# Patient Record
Sex: MALE | Race: WHITE | NOT HISPANIC OR LATINO | URBAN - METROPOLITAN AREA
[De-identification: names, ages, dates, MRNs, and addresses within clinical notes are randomized per-mention and may not be internally consistent; named-entity substitution may affect disease eponyms.]

---

## 2018-03-27 ENCOUNTER — APPOINTMENT (RX ONLY)
Dept: URBAN - METROPOLITAN AREA CLINIC 23 | Facility: CLINIC | Age: 73
Setting detail: DERMATOLOGY
End: 2018-03-27

## 2018-03-27 DIAGNOSIS — L57.0 ACTINIC KERATOSIS: ICD-10-CM

## 2018-03-27 DIAGNOSIS — L82.1 OTHER SEBORRHEIC KERATOSIS: ICD-10-CM

## 2018-03-27 DIAGNOSIS — L21.8 OTHER SEBORRHEIC DERMATITIS: ICD-10-CM

## 2018-03-27 DIAGNOSIS — D22 MELANOCYTIC NEVI: ICD-10-CM

## 2018-03-27 PROBLEM — D22.62 MELANOCYTIC NEVI OF LEFT UPPER LIMB, INCLUDING SHOULDER: Status: ACTIVE | Noted: 2018-03-27

## 2018-03-27 PROBLEM — D22.61 MELANOCYTIC NEVI OF RIGHT UPPER LIMB, INCLUDING SHOULDER: Status: ACTIVE | Noted: 2018-03-27

## 2018-03-27 PROCEDURE — ? TREATMENT REGIMEN

## 2018-03-27 PROCEDURE — ? PRESCRIPTION

## 2018-03-27 PROCEDURE — ? COUNSELING

## 2018-03-27 PROCEDURE — ? OBSERVATION AND MEASURE

## 2018-03-27 PROCEDURE — ? LIQUID NITROGEN

## 2018-03-27 PROCEDURE — ? RECORDS REQUESTED

## 2018-03-27 PROCEDURE — 99202 OFFICE O/P NEW SF 15 MIN: CPT | Mod: 25

## 2018-03-27 PROCEDURE — 17000 DESTRUCT PREMALG LESION: CPT

## 2018-03-27 RX ORDER — CLOBETASOL PROPIONATE 0.46 MG/ML
SOLUTION TOPICAL
Qty: 1 | Refills: 5 | Status: ERX | COMMUNITY
Start: 2018-03-27

## 2018-03-27 RX ORDER — KETOCONAZOLE 20.5 MG/ML
SHAMPOO, SUSPENSION TOPICAL
Qty: 1 | Refills: 11 | Status: ERX | COMMUNITY
Start: 2018-03-27

## 2018-03-27 RX ADMIN — KETOCONAZOLE: 20.5 SHAMPOO, SUSPENSION TOPICAL at 00:00

## 2018-03-27 RX ADMIN — CLOBETASOL PROPIONATE: 0.46 SOLUTION TOPICAL at 00:00

## 2018-03-27 ASSESSMENT — LOCATION DETAILED DESCRIPTION DERM
LOCATION DETAILED: LEFT DISTAL RADIAL DORSAL FOREARM
LOCATION DETAILED: RIGHT RADIAL DORSAL HAND
LOCATION DETAILED: LEFT PROXIMAL POSTERIOR UPPER ARM
LOCATION DETAILED: RIGHT ANTERIOR SHOULDER
LOCATION DETAILED: RIGHT SUPERIOR PARIETAL SCALP

## 2018-03-27 ASSESSMENT — LOCATION SIMPLE DESCRIPTION DERM
LOCATION SIMPLE: LEFT UPPER ARM
LOCATION SIMPLE: RIGHT SHOULDER
LOCATION SIMPLE: SCALP
LOCATION SIMPLE: LEFT FOREARM
LOCATION SIMPLE: RIGHT HAND

## 2018-03-27 ASSESSMENT — LOCATION ZONE DERM
LOCATION ZONE: ARM
LOCATION ZONE: SCALP
LOCATION ZONE: HAND

## 2018-03-27 NOTE — PROCEDURE: TREATMENT REGIMEN
Initiate Treatment: Ketoconazole shampoo, selsun blue shampoo, TSal on Mon, Wed, Fri 2 weeks rotations, leaving in 5min before rinsing.  Clobetasol scalp bid to hot spots x 2wks/mo only
Detail Level: Zone

## 2018-03-27 NOTE — PROCEDURE: LIQUID NITROGEN
Number Of Freeze-Thaw Cycles: 1 freeze-thaw cycle
Consent: The patient's verbal consent was obtained including but not limited to risks of crusting, scabbing, blistering, scarring, darker or lighter pigmentary change, recurrence, incomplete removal and infection.
Detail Level: Simple
Post-Care Instructions: I reviewed with the patient in detail post-care instructions. Patient is to wear sunprotection, and avoid picking at any of the treated lesions. Pt may apply Vaseline to crusted or scabbing areas.
Render Post-Care Instructions In Note?: no
Duration Of Freeze Thaw-Cycle (Seconds): 0

## 2018-03-27 NOTE — PROCEDURE: RECORDS REQUESTED
Preface: I requested the records from the following providers.
Detail Level: Zone
Providers To Request Records From: Dr. Reuben Zhu

## 2018-07-10 ENCOUNTER — APPOINTMENT (RX ONLY)
Dept: URBAN - METROPOLITAN AREA CLINIC 24 | Facility: CLINIC | Age: 73
Setting detail: DERMATOLOGY
End: 2018-07-10

## 2018-07-10 DIAGNOSIS — L30.9 DERMATITIS, UNSPECIFIED: ICD-10-CM

## 2018-07-10 PROCEDURE — 11100: CPT

## 2018-07-10 PROCEDURE — ? BIOPSY BY SHAVE METHOD

## 2018-07-10 PROCEDURE — ? COUNSELING

## 2018-07-10 ASSESSMENT — LOCATION DETAILED DESCRIPTION DERM: LOCATION DETAILED: RIGHT OCCIPITAL SCALP

## 2018-07-10 ASSESSMENT — LOCATION SIMPLE DESCRIPTION DERM: LOCATION SIMPLE: POSTERIOR SCALP

## 2018-07-10 ASSESSMENT — LOCATION ZONE DERM: LOCATION ZONE: SCALP

## 2018-07-10 NOTE — PROCEDURE: BIOPSY BY SHAVE METHOD
Post-Care Instructions: I reviewed with the patient in detail post-care instructions. Patient is to keep the biopsy site dry overnight, and then apply bacitracin twice daily until healed. Patient may apply hydrogen peroxide soaks to remove any crusting.
Bill 55871 For Specimen Handling/Conveyance To Laboratory?: no
Electrodesiccation And Curettage Text: The wound bed was treated with electrodesiccation and curettage after the biopsy was performed.
Detail Level: Detailed
Wound Care: Vaseline
Cryotherapy Text: The wound bed was treated with cryotherapy after the biopsy was performed.
Notification Instructions: Patient will be notified of biopsy results. However, patient instructed to call the office if not contacted within 2 weeks.
Size Of Lesion In Cm: 0
Depth Of Biopsy: dermis
Consent: Written consent was obtained and risks were reviewed including but not limited to scarring, infection, bleeding, scabbing, incomplete removal, nerve damage and allergy to anesthesia.
Curettage Text: .
Electrodesiccation Text: The wound bed was treated with electrodesiccation after the biopsy was performed.
Anesthesia Volume In Cc: 0.5
Billing Type: Third-Party Bill
Silver Nitrate Text: The wound bed was treated with silver nitrate after the biopsy was performed.
Dressing: no dressing applied
Biopsy Type: H and E
Anesthesia Type: 1% lidocaine without epinephrine and a 1:10 solution of 8.4% sodium bicarbonate
Biopsy Method: Dermablade
Type Of Destruction Used: Curettage
Hemostasis: Aluminum Chloride

## 2018-07-17 ENCOUNTER — RX ONLY (OUTPATIENT)
Age: 73
Setting detail: RX ONLY
End: 2018-07-17

## 2018-07-17 RX ORDER — FLUOCINOLONE ACETONIDE 0.11 MG/ML
OIL TOPICAL
Qty: 1 | Refills: 10 | Status: ERX | COMMUNITY
Start: 2018-07-17

## 2018-07-17 RX ORDER — BETAMETHASONE VALERATE 1 MG/G
OINTMENT TOPICAL
Qty: 1 | Refills: 4 | Status: ERX | COMMUNITY
Start: 2018-07-17

## 2019-02-13 ENCOUNTER — HOSPITAL ENCOUNTER (OUTPATIENT)
Dept: ORTHOPEDIC SURGERY | Facility: CLINIC | Age: 74
Discharge: HOME | End: 2019-02-13
Attending: ORTHOPAEDIC SURGERY | Admitting: ORTHOPAEDIC SURGERY

## 2019-02-27 ENCOUNTER — HOSPITAL ENCOUNTER (OUTPATIENT)
Dept: MRI IMAGING | Facility: HOSPITAL | Age: 74
Discharge: HOME | End: 2019-02-27
Attending: ORTHOPAEDIC SURGERY | Admitting: ORTHOPAEDIC SURGERY

## 2019-02-27 LAB
BASOPHILS # BLD AUTO: 0.1 10*3/UL (ref 0–0.2)
BASOPHILS NFR BLD AUTO: 1 % (ref 0–2)
CREAT BLDA-MCNC: 0.9 MG/DL (ref 0.6–1.3)
CRP SERPL-MCNC: 0.4 MG/DL (ref 0–0.7)
DIFFERENTIAL METHOD BLD: (no result)
EOSINOPHIL # BLD AUTO: 0.2 10*3/UL (ref 0–0.3)
EOSINOPHIL # BLD AUTO: 4 % (ref 0–3)
ERYTHROCYTE [DISTWIDTH] IN BLOOD BY AUTOMATED COUNT: 13.5 % (ref 11.5–14.5)
ERYTHROCYTE [SEDIMENTATION RATE] IN BLOOD BY WESTERGREN METHOD: 20 MM/HR (ref 0–20)
HCT VFR BLD AUTO: 43.2 % (ref 40–54)
HGB BLD-MCNC: 14.8 G/DL (ref 14–18)
LYMPHOCYTES # BLD AUTO: 1.2 10*3/UL (ref 0.8–4.8)
LYMPHOCYTES NFR BLD AUTO: 24 % (ref 18–42)
MCH RBC QN AUTO: 31.4 PG (ref 26–32)
MCHC RBC AUTO-ENTMCNC: 34.4 G/DL (ref 32–36)
MCV RBC AUTO: 91.3 FL (ref 80–94)
MONOCYTES # BLD AUTO: 0.5 10*3/UL (ref 0.1–1.3)
MONOCYTES NFR BLD AUTO: 9 % (ref 2–11)
NEUTROPHILS # BLD AUTO: 3.3 10*3/UL (ref 2.3–8.6)
NEUTROPHILS NFR BLD AUTO: 62 % (ref 50–75)
NRBC BLD AUTO-RTO: 0 /100{WBCS}
NRBC/RBC NFR BLD MANUAL: 0 10*3/UL
PLATELET # BLD AUTO: 214 10*3/UL (ref 150–450)
PMV BLD AUTO: 8.9 FL (ref 7.4–10.4)
RBC # BLD AUTO: 4.73 10*6/UL (ref 4.6–6)
WBC # BLD AUTO: 5.2 10*3/UL (ref 4.5–11.5)

## 2019-05-22 ENCOUNTER — HOSPITAL ENCOUNTER (OUTPATIENT)
Dept: ORTHOPEDIC SURGERY | Facility: CLINIC | Age: 74
Discharge: HOME | End: 2019-05-22
Attending: ORTHOPAEDIC SURGERY | Admitting: ORTHOPAEDIC SURGERY

## 2019-05-22 ENCOUNTER — CONVERSION ENCOUNTER (OUTPATIENT)
Dept: ORTHOPEDIC SURGERY | Facility: CLINIC | Age: 74
End: 2019-05-22

## 2019-06-04 VITALS — DIASTOLIC BLOOD PRESSURE: 78 MMHG | SYSTOLIC BLOOD PRESSURE: 128 MMHG | HEART RATE: 71 BPM | HEIGHT: 66 IN

## 2019-09-17 ENCOUNTER — HOSPITAL ENCOUNTER (OUTPATIENT)
Dept: OTHER | Facility: HOSPITAL | Age: 74
Discharge: HOME | End: 2019-09-17

## 2019-09-17 DIAGNOSIS — Z00.6 EXAMINATION FOR NORMAL COMPARISON OR CONTROL IN CLINICAL RESEARCH: ICD-10-CM

## 2019-09-18 ENCOUNTER — OFFICE VISIT (OUTPATIENT)
Dept: ORTHOPEDIC SURGERY | Facility: CLINIC | Age: 74
End: 2019-09-18

## 2019-09-18 VITALS
HEIGHT: 66 IN | BODY MASS INDEX: 23.3 KG/M2 | HEART RATE: 74 BPM | WEIGHT: 145 LBS | SYSTOLIC BLOOD PRESSURE: 114 MMHG | DIASTOLIC BLOOD PRESSURE: 79 MMHG

## 2019-09-18 DIAGNOSIS — M54.5 LOW BACK PAIN, UNSPECIFIED BACK PAIN LATERALITY, UNSPECIFIED CHRONICITY, WITH SCIATICA PRESENCE UNSPECIFIED: Primary | ICD-10-CM

## 2019-09-18 PROCEDURE — 99213 OFFICE O/P EST LOW 20 MIN: CPT | Performed by: ORTHOPAEDIC SURGERY

## 2019-09-18 RX ORDER — TRAZODONE HYDROCHLORIDE 50 MG/1
TABLET ORAL
Refills: 3 | COMMUNITY
Start: 2019-08-28

## 2019-09-18 RX ORDER — L.ACID,CASEI/B.ANIMAL/S.THERMO 16B CELL
1 CAPSULE ORAL DAILY
COMMUNITY
End: 2019-10-02

## 2019-09-18 RX ORDER — DOCUSATE CALCIUM 240 MG
240 CAPSULE ORAL 2 TIMES DAILY
COMMUNITY

## 2019-09-18 RX ORDER — TRAMADOL HYDROCHLORIDE 50 MG/1
50 TABLET ORAL EVERY 6 HOURS PRN
Refills: 0 | COMMUNITY
Start: 2019-08-14 | End: 2019-11-07

## 2019-09-18 RX ORDER — OMEPRAZOLE 40 MG/1
CAPSULE, DELAYED RELEASE ORAL
COMMUNITY
Start: 2018-12-26 | End: 2019-11-07

## 2019-09-18 RX ORDER — GABAPENTIN 100 MG/1
CAPSULE ORAL
Refills: 5 | COMMUNITY
Start: 2019-08-26 | End: 2019-10-02

## 2019-09-18 RX ORDER — HYDROCODONE BITARTRATE AND ACETAMINOPHEN 7.5; 325 MG/1; MG/1
1 TABLET ORAL EVERY 6 HOURS PRN
Qty: 30 TABLET | Refills: 0 | Status: SHIPPED | OUTPATIENT
Start: 2019-09-18 | End: 2019-10-14 | Stop reason: SDUPTHER

## 2019-09-18 RX ORDER — PREDNISONE 1 MG/1
TABLET ORAL
COMMUNITY
Start: 2019-02-13 | End: 2019-10-15

## 2019-09-18 NOTE — PROGRESS NOTES
Visit Type: Follow Up  Referring Provider: No ref. provider found  Primary Care Provider: Provider, No Known    Patient Name: Mamadou Romeo  Patient Age: 73 y.o.  Chief Complaint: had concerns including Pain and Follow-up of the Lumbar Spine.    Subjective   History of Present Illness: This patient is a pleasant 73 years old gentleman who is here for follow-up patient is a status post lumbar decompression with lumbar fusion of L2-ilium patient has a significant pain and discomfort in his right lower extremity that pain is completely gone the patient is having pain and weakness of left lower extremity patient transfer to rehab following his surgery to [facility] and due to having weakness of left lower extremity the patient had a left hip MRI and lumbar spine MRI which is nondiagnostic.      Review of Systems   Gastrointestinal:        Patient developed a bowel obstruction after his lumbar spine surgery patient underwent colostomy by Dr. Collins and the patient is going to see her today   Musculoskeletal: Positive for back pain and myalgias.   Neurological: Positive for weakness and numbness.       The following portions of the patient's history were reviewed and updated as appropriate: allergies, current medications, past family history, past medical history, past social history, past surgical history and problem list.    No past medical history on file.    No past surgical history on file.    Vitals:    09/18/19 0915   BP: 114/79   Pulse: 74       There is no problem list on file for this patient.      Family History Summary:  family history is not on file.    Social History     Socioeconomic History   • Marital status:      Spouse name: Not on file   • Number of children: Not on file   • Years of education: Not on file   • Highest education level: Not on file   Tobacco Use   • Smoking status: Never Smoker   Substance and Sexual Activity   • Alcohol use: No     Frequency: Never   • Drug use: No   •  Sexual activity: Defer         Current Outpatient Medications:   •  docusate calcium (SURFAK) 240 MG capsule, Take 240 mg by mouth., Disp: , Rfl:   •  gabapentin (NEURONTIN) 100 MG capsule, TAKE 2 CAPSULES (200 MG) BY MOUTH EVERY 8 HOURS, Disp: , Rfl: 5  •  MULTIPLE VITAMIN PO, Take 1 tablet by mouth Daily., Disp: , Rfl:   •  omeprazole (priLOSEC) 40 MG capsule, omeprazole 40 mg capsule,delayed release, Disp: , Rfl:   •  predniSONE (DELTASONE) 5 MG tablet, 3 tabs daily, Disp: , Rfl:   •  Probiotic Product (RISAQUAD-2) capsule capsule, Take 1 capsule by mouth Daily., Disp: , Rfl:   •  traMADol (ULTRAM) 50 MG tablet, Take 50 mg by mouth Every 6 (Six) Hours As Needed. for pain, Disp: , Rfl: 0  •  traZODone (DESYREL) 50 MG tablet, TAKE 1 TABLET (50 MG) BY MOUTH AT BEDTIME, Disp: , Rfl: 3  •  HYDROcodone-acetaminophen (NORCO) 7.5-325 MG per tablet, Take 1 tablet by mouth Every 6 (Six) Hours As Needed for Moderate Pain ., Disp: 30 tablet, Rfl: 0    Allergies   Allergen Reactions   • Terbinafine GI Intolerance     abd pain     • Shellfish Allergy Nausea Only     nausea           Objective   Physical Exam  Back Exam     Tenderness   The patient is experiencing tenderness in the lumbar.    Range of Motion   Extension:  20 abnormal   Flexion:  30 abnormal   Lateral bend right:  10 abnormal   Lateral bend left:  10 abnormal   Rotation right:  20 abnormal   Rotation left:  20 abnormal     Muscle Strength   Right Quadriceps:  5/5   Left Quadriceps:  3/5   Right Hamstrings:  5/5   Left Hamstrings:  4/5     Tests   Straight leg raise left: negative    Reflexes   Patellar: 1/4  Achilles: 1/4  Biceps: 1/4  Babinski's sign: normal     Other   Toe walk: abnormal  Heel walk: abnormal  Sensation: decreased  Gait: abnormal               Impression and Plan: This patient is here today for follow-up patient is a status post L2-fusion several months ago the patient went to rehab underwent rehabilitation the patient pain and discomfort in  the right lower extremity is completely better patient has weakness of left lower extremity and difficulty ambulating his left leg is giving out of him the patient had a bilateral pre-existing foot drop and my clinical exam is compatible with weakness of left hip flexor and hip abductor at the range of 4/5 the extensor is 3/5 and ankle dorsiflexors are 1/5 bilaterally, x-ray of the lumbar spine is compatible with the positioning of implantation residual scoliosis cephalad to implantation of throat lumbar junction.  Patient had MRI of left hip and he was diagnosed with tear in his gluteus medius and MRI of lumbar spine is extremely nondiagnostic  Plan; my recommendation is to proceed with lumbar myelogram post myelo CT scan of lumbar spine for evaluation of source of weakness in his left lower extremity and the plan to do that test next Tuesday patient is coming to see Sammy that I will discuss the case with normal to be able to make a decision for him.  I need to give him hydrocodone 7.5-325 take 1 every 6 hours p.o. as needed for pain related to stop his tramadol and trazodone proceed with myelogram.    Low back pain, unspecified back pain laterality, unspecified chronicity, with sciatica presence unspecified [M54.5]     Orders Placed This Encounter   Procedures   • XR Spine Lumbar 1 View     Scheduling Instructions:      rm 16      1000      lspine lat standing      Low back pain, post op 4/11/19 TLIF L2-L5; Mahesh Julisa, Rt L5-S1      Pt in wheelchair, has walker and can stand with walker for xray     Order Specific Question:   Reason for Exam:     Answer:   low back pain, s/p fusion   • IR Myelogram Lumbar Spine     Standing Status:   Future     Standing Expiration Date:   9/18/2020     Order Specific Question:   Reason for Exam:     Answer:   weakness of left leg   • CT Lumbar Spine With Intrathecal Contrast     Standing Status:   Future     Standing Expiration Date:   9/18/2020               Ekta White,  MD  09/18/19  11:23 AM

## 2019-09-24 ENCOUNTER — HOSPITAL ENCOUNTER (OUTPATIENT)
Dept: CT IMAGING | Facility: HOSPITAL | Age: 74
Discharge: HOME | End: 2019-09-24

## 2019-09-24 ENCOUNTER — APPOINTMENT (OUTPATIENT)
Dept: GENERAL RADIOLOGY | Facility: HOSPITAL | Age: 74
End: 2019-09-24

## 2019-09-30 RX ORDER — SODIUM CHLORIDE 0.9 % (FLUSH) 0.9 %
3 SYRINGE (ML) INJECTION EVERY 12 HOURS SCHEDULED
Status: DISCONTINUED | OUTPATIENT
Start: 2019-09-30 | End: 2019-10-02 | Stop reason: HOSPADM

## 2019-10-02 ENCOUNTER — OFFICE VISIT (OUTPATIENT)
Dept: ORTHOPEDIC SURGERY | Facility: CLINIC | Age: 74
End: 2019-10-02

## 2019-10-02 ENCOUNTER — HOSPITAL ENCOUNTER (OUTPATIENT)
Dept: CT IMAGING | Facility: HOSPITAL | Age: 74
Discharge: HOME | End: 2019-10-02

## 2019-10-02 ENCOUNTER — HOSPITAL ENCOUNTER (OUTPATIENT)
Dept: GENERAL RADIOLOGY | Facility: HOSPITAL | Age: 74
Discharge: HOME | End: 2019-10-02
Admitting: RADIOLOGY

## 2019-10-02 VITALS
SYSTOLIC BLOOD PRESSURE: 140 MMHG | RESPIRATION RATE: 12 BRPM | BODY MASS INDEX: 26.5 KG/M2 | TEMPERATURE: 98.5 F | HEART RATE: 73 BPM | OXYGEN SATURATION: 98 % | DIASTOLIC BLOOD PRESSURE: 89 MMHG | WEIGHT: 135 LBS | HEIGHT: 60 IN

## 2019-10-02 VITALS — HEART RATE: 83 BPM | SYSTOLIC BLOOD PRESSURE: 128 MMHG | DIASTOLIC BLOOD PRESSURE: 88 MMHG

## 2019-10-02 DIAGNOSIS — M51.37 DDD (DEGENERATIVE DISC DISEASE), LUMBOSACRAL: Primary | ICD-10-CM

## 2019-10-02 DIAGNOSIS — M54.50 LOW BACK PAIN: ICD-10-CM

## 2019-10-02 DIAGNOSIS — M54.5 LOW BACK PAIN, UNSPECIFIED BACK PAIN LATERALITY, UNSPECIFIED CHRONICITY, WITH SCIATICA PRESENCE UNSPECIFIED: ICD-10-CM

## 2019-10-02 LAB
APTT PPP: 26.9 SECONDS (ref 24–31)
BASOPHILS # BLD AUTO: 0.1 10*3/MM3 (ref 0–0.2)
BASOPHILS NFR BLD AUTO: 1.2 % (ref 0–1.5)
DEPRECATED RDW RBC AUTO: 49 FL (ref 37–54)
EOSINOPHIL # BLD AUTO: 0.2 10*3/MM3 (ref 0–0.4)
EOSINOPHIL NFR BLD AUTO: 2.3 % (ref 0.3–6.2)
ERYTHROCYTE [DISTWIDTH] IN BLOOD BY AUTOMATED COUNT: 16.3 % (ref 12.3–15.4)
HCT VFR BLD AUTO: 44.8 % (ref 37.5–51)
HGB BLD-MCNC: 15.3 G/DL (ref 13–17.7)
INR PPP: 1 (ref 0.9–1.1)
LYMPHOCYTES # BLD AUTO: 2.2 10*3/MM3 (ref 0.7–3.1)
LYMPHOCYTES NFR BLD AUTO: 27.9 % (ref 19.6–45.3)
MCH RBC QN AUTO: 29.6 PG (ref 26.6–33)
MCHC RBC AUTO-ENTMCNC: 34.1 G/DL (ref 31.5–35.7)
MCV RBC AUTO: 86.7 FL (ref 79–97)
MONOCYTES # BLD AUTO: 0.6 10*3/MM3 (ref 0.1–0.9)
MONOCYTES NFR BLD AUTO: 6.9 % (ref 5–12)
NEUTROPHILS # BLD AUTO: 5 10*3/MM3 (ref 1.7–7)
NEUTROPHILS NFR BLD AUTO: 61.7 % (ref 42.7–76)
NRBC BLD AUTO-RTO: 0 /100 WBC (ref 0–0.2)
PLATELET # BLD AUTO: 249 10*3/MM3 (ref 140–450)
PMV BLD AUTO: 7.5 FL (ref 6–12)
PROTHROMBIN TIME: 10.5 SECONDS (ref 9.6–11.7)
RBC # BLD AUTO: 5.17 10*6/MM3 (ref 4.14–5.8)
WBC NRBC COR # BLD: 8 10*3/MM3 (ref 3.4–10.8)

## 2019-10-02 PROCEDURE — 72240 MYELOGRAPHY NECK SPINE: CPT

## 2019-10-02 PROCEDURE — 85730 THROMBOPLASTIN TIME PARTIAL: CPT | Performed by: RADIOLOGY

## 2019-10-02 PROCEDURE — 99213 OFFICE O/P EST LOW 20 MIN: CPT | Performed by: ORTHOPAEDIC SURGERY

## 2019-10-02 PROCEDURE — 72132 CT LUMBAR SPINE W/DYE: CPT

## 2019-10-02 PROCEDURE — 85610 PROTHROMBIN TIME: CPT | Performed by: RADIOLOGY

## 2019-10-02 PROCEDURE — 62304 MYELOGRAPHY LUMBAR INJECTION: CPT

## 2019-10-02 PROCEDURE — 85025 COMPLETE CBC W/AUTO DIFF WBC: CPT | Performed by: RADIOLOGY

## 2019-10-02 RX ORDER — LIDOCAINE HYDROCHLORIDE 10 MG/ML
20 INJECTION, SOLUTION INFILTRATION; PERINEURAL
Status: DISCONTINUED | OUTPATIENT
Start: 2019-10-02 | End: 2019-10-03 | Stop reason: HOSPADM

## 2019-10-02 RX ORDER — BACLOFEN 10 MG/1
10 TABLET ORAL 2 TIMES DAILY
Qty: 60 TABLET | Refills: 0 | Status: SHIPPED | OUTPATIENT
Start: 2019-10-02 | End: 2019-10-28 | Stop reason: SDUPTHER

## 2019-10-02 RX ORDER — GABAPENTIN 300 MG/1
300 CAPSULE ORAL 3 TIMES DAILY
Qty: 90 CAPSULE | Refills: 0 | Status: ON HOLD | OUTPATIENT
Start: 2019-10-02 | End: 2019-11-26 | Stop reason: SDUPTHER

## 2019-10-02 NOTE — PROGRESS NOTES
Visit Type: Follow Up  Referring Provider: No ref. provider found  Primary Care Provider: Tyron Ruffin MD    Patient Name: Mamadou Romeo  Patient Age: 73 y.o.  Chief Complaint: had concerns including Follow-up of the Lumbar Spine (F/u after ct myelogram lumbar).    Subjective   History of Present Illness: Mr. Romeo is a very pleasant 73 years old gentleman who is a status post reconstructive lumbar spine surgery and lumbar fusion of L2-ilium due to loss of lordosis and degenerative scoliosis, patient is a status post multiple previous lumbar surgery in another facility, patient was in the hospital transfer to rehab in [facility] the patient stated to me after the surgery he has right leg pain but better patient is having significant pain and weakness in his left lower extremity patient had MRI of pelvis and MRI of lumbar spine which it was nonconclusive and I have done a lumbar myelogram post myelo CT scan patient is here for further evaluation and plan of treatment.      Review of Systems   Constitutional: Positive for activity change and fatigue.   Gastrointestinal:        Patient is a status post colostomy patient is going to see Dr. Collins in October 24 for closure of ostomy   Musculoskeletal: Positive for back pain, myalgias, neck pain and neck stiffness.   Neurological: Positive for weakness and numbness.       The following portions of the patient's history were reviewed and updated as appropriate: allergies, current medications, past family history, past medical history, past social history, past surgical history and problem list.    Past Medical History:   Diagnosis Date   • Cancer (CMS/HCC)    • Hypertension        Past Surgical History:   Procedure Laterality Date   • BACK SURGERY     • COLON SURGERY     • SKIN BIOPSY         Vitals:    10/02/19 1316   BP: 128/88   Pulse: 83       There is no problem list on file for this patient.      Family History Summary:  family history is not on  file.    Social History     Socioeconomic History   • Marital status:      Spouse name: Not on file   • Number of children: Not on file   • Years of education: Not on file   • Highest education level: Not on file   Tobacco Use   • Smoking status: Never Smoker   Substance and Sexual Activity   • Alcohol use: No     Frequency: Never   • Drug use: No   • Sexual activity: Defer         Current Outpatient Medications:   •  docusate calcium (SURFAK) 240 MG capsule, Take 240 mg by mouth., Disp: , Rfl:   •  HYDROcodone-acetaminophen (NORCO) 7.5-325 MG per tablet, Take 1 tablet by mouth Every 6 (Six) Hours As Needed for Moderate Pain ., Disp: 30 tablet, Rfl: 0  •  MULTIPLE VITAMIN PO, Take 1 tablet by mouth Daily., Disp: , Rfl:   •  omeprazole (priLOSEC) 40 MG capsule, omeprazole 40 mg capsule,delayed release, Disp: , Rfl:   •  predniSONE (DELTASONE) 5 MG tablet, 3 tabs daily, Disp: , Rfl:   •  traMADol (ULTRAM) 50 MG tablet, Take 50 mg by mouth Every 6 (Six) Hours As Needed. for pain, Disp: , Rfl: 0  •  traZODone (DESYREL) 50 MG tablet, TAKE 1 TABLET (50 MG) BY MOUTH AT BEDTIME, Disp: , Rfl: 3  •  baclofen (LIORESAL) 10 MG tablet, Take 1 tablet by mouth 2 (Two) Times a Day., Disp: 60 tablet, Rfl: 0  •  gabapentin (NEURONTIN) 300 MG capsule, Take 1 capsule by mouth 3 (Three) Times a Day., Disp: 90 capsule, Rfl: 0  No current facility-administered medications for this visit.     Facility-Administered Medications Ordered in Other Visits:   •  iopamidol (ISOVUE-M 200) injection 41%, 20 mL, Intrathecal, Once in imaging, Ekta White MD  •  lidocaine (XYLOCAINE) 1 % injection 20 mL, 20 mL, Intra-articular, Once in imaging, Ekta White MD    Allergies   Allergen Reactions   • Terbinafine GI Intolerance     abd pain     • Shellfish Allergy Nausea Only     nausea           Objective   Physical Exam  Back Exam     Tenderness   The patient is experiencing tenderness in the lumbar.    Range of Motion   Extension:  40  abnormal   Flexion:  50 abnormal   Lateral bend right:  30 abnormal   Lateral bend left:  30 abnormal   Rotation right:  20 abnormal   Rotation left:  20 abnormal     Muscle Strength   The patient has normal back strength.  Right Quadriceps:  3/5   Left Quadriceps:  3/5   Right Hamstrings:  3/5   Left Hamstrings:  3/5     Tests   Straight leg raise right: positive at 70 deg  Straight leg raise left: positive at 70 deg    Reflexes   Patellar: Hyporeflexic  Achilles: Hyporeflexic  Biceps: Hyporeflexic  Babinski's sign: normal     Other   Toe walk: abnormal  Heel walk: abnormal  Sensation: decreased  Gait: abnormal               Impression and Plan: This patient is here today for follow-up the patient is a status post L2-S2 fusion several months ago the patient has been doing inpatient physical therapy and stated to me his right leg pain is much better patient is complaining of pain and discomfort in his left lower extremity patient has had a pre-existing bilateral foot drop which is not getting better following surgery, muscle testing of both lower extremities are at the range of 3-4/5 the patient has a significant flexion contracture of his knees.  Patient had a lumbar myelogram post myelo CT scan which is demonstrating normal positioning of implantation no sign of a spinal and foraminal stenosis no sign of loosening of implant.  Plan; I am going to change his gabapentin to 300 mg twice daily, I am going to give him baclofen 10 mg twice daily and my recommendation is to continue aggressive course of rehabilitation for his lower extremities for correction of flexion contracture and ambulation, endurance and walking, I believe if he does not do rehabilitation the patient is going to confined to wheelchair and going to be extremity difficult for him to ambulate down the road I would like to see this patient my office next 3 months.    No primary diagnosis found.     No orders of the defined types were placed in this  encounter.              Ekta White MD  10/02/19  2:13 PM

## 2019-10-02 NOTE — DISCHARGE INSTRUCTIONS
A responsible adult should stay with you and you should rest quietly for the rest of the day. Do not drink alcohol, drive or cook for 24 hours following your procedure.  Progress your diet as tolerated.  Increase your fluid intake over the next 24 hours.  Resume your usually medications including aspirin tomorrow.  When you remove your dressing in 24 hours, a small amount of blood is to be expected. Do not be alarmed.  If you feel it is bleeding excessively apply pressure and proceed to the Emergency room.  Do not shower, bath, or get your dressing wet at all for 24 hours.  You may shower after the dressing is removed. Keep your head elevated at least 30 degrees for the next 24 hours.  No lifting more that 10 pounds for 24 hours.  If severe pain, headache not relieved by over the counter medications, increased shortness of air or racing heartbeat occur, seek immediate medical attention.  Follow up with Dr. White for results.

## 2019-10-14 NOTE — TELEPHONE ENCOUNTER
Voicemail from patient requesting refill on Norco 7.5 / 325mg. Last till 9/18/2019 1 po q 6 hrs prn #30. Please review and approve or deny as appropriate. Thank you!

## 2019-10-15 NOTE — TELEPHONE ENCOUNTER
Patient requesting 30 days if possible. Advised that typically we cannot fill for 30 days but would let Dr. White know regarding this.

## 2019-10-16 RX ORDER — HYDROCODONE BITARTRATE AND ACETAMINOPHEN 7.5; 325 MG/1; MG/1
1 TABLET ORAL EVERY 6 HOURS PRN
Qty: 28 TABLET | Refills: 0 | Status: SHIPPED | OUTPATIENT
Start: 2019-10-16 | End: 2019-11-27 | Stop reason: HOSPADM

## 2019-10-18 ENCOUNTER — APPOINTMENT (OUTPATIENT)
Dept: PREADMISSION TESTING | Facility: HOSPITAL | Age: 74
End: 2019-10-18

## 2019-10-21 ENCOUNTER — TELEPHONE (OUTPATIENT)
Dept: SURGERY | Facility: CLINIC | Age: 74
End: 2019-10-21

## 2019-10-22 ENCOUNTER — TELEPHONE (OUTPATIENT)
Dept: SURGERY | Facility: CLINIC | Age: 74
End: 2019-10-22

## 2019-10-28 RX ORDER — BACLOFEN 10 MG/1
10 TABLET ORAL 2 TIMES DAILY
Qty: 60 TABLET | Refills: 3 | Status: SHIPPED | OUTPATIENT
Start: 2019-10-28 | End: 2019-11-27 | Stop reason: HOSPADM

## 2019-10-28 NOTE — TELEPHONE ENCOUNTER
Fax refill req for Baclofen 10mg 1 po bid #60. Last office visit 10/2/2019, next ov 01/08/2020. Please review.

## 2019-10-31 ENCOUNTER — APPOINTMENT (OUTPATIENT)
Dept: PREADMISSION TESTING | Facility: HOSPITAL | Age: 74
End: 2019-10-31

## 2019-11-07 RX ORDER — OMEPRAZOLE 40 MG/1
40 CAPSULE, DELAYED RELEASE ORAL DAILY
COMMUNITY

## 2019-11-11 ENCOUNTER — ANESTHESIA EVENT (OUTPATIENT)
Dept: PERIOP | Facility: HOSPITAL | Age: 74
End: 2019-11-11

## 2019-11-11 ENCOUNTER — TELEPHONE (OUTPATIENT)
Dept: ORTHOPEDIC SURGERY | Facility: CLINIC | Age: 74
End: 2019-11-11

## 2019-11-11 NOTE — TELEPHONE ENCOUNTER
I would like to look at his EMG results and when you please let me know to go to visit him while he is in the hospital

## 2019-11-11 NOTE — TELEPHONE ENCOUNTER
Call from patient, stating he will be in the hospital tomorrow under the care of Dr. Collins for a Colostomy revision. Wanted to know if Dr. White would want to see him while he is inpatient. Advised that Dr. White has clinic tomorrow but would let him know. Patient states he will be in he hospital two to three days. States he has had some improvement in the Right leg but still issues with the Left leg. Reports he was told recently he would be in a wheelchair for the rest of his life. States seeing his PCP today to review a recent EMG he had done and will have that faxed to us along with other notes from recent visits.

## 2019-11-12 ENCOUNTER — HOSPITAL ENCOUNTER (INPATIENT)
Facility: HOSPITAL | Age: 74
LOS: 6 days | Discharge: NURSING, REHAB OR LONG-TERM CARE | End: 2019-11-18
Attending: SURGERY | Admitting: SURGERY

## 2019-11-12 ENCOUNTER — ANESTHESIA (OUTPATIENT)
Dept: PERIOP | Facility: HOSPITAL | Age: 74
End: 2019-11-12

## 2019-11-12 PROBLEM — Z98.890 HISTORY OF COLOSTOMY REVERSAL: Status: RESOLVED | Noted: 2019-11-12 | Resolved: 2019-11-12

## 2019-11-12 PROBLEM — Z98.890 HISTORY OF COLOSTOMY REVERSAL: Status: ACTIVE | Noted: 2019-11-12

## 2019-11-12 LAB
ABO GROUP BLD: NORMAL
BLD GP AB SCN SERPL QL: NEGATIVE
DEPRECATED RDW RBC AUTO: 45.1 FL (ref 37–54)
ERYTHROCYTE [DISTWIDTH] IN BLOOD BY AUTOMATED COUNT: 14.4 % (ref 12.3–15.4)
HCT VFR BLD AUTO: 42.7 % (ref 37.5–51)
HGB BLD-MCNC: 14.5 G/DL (ref 13–17.7)
MCH RBC QN AUTO: 29.8 PG (ref 26.6–33)
MCHC RBC AUTO-ENTMCNC: 34 G/DL (ref 31.5–35.7)
MCV RBC AUTO: 87.8 FL (ref 79–97)
PLATELET # BLD AUTO: 270 10*3/MM3 (ref 140–450)
PMV BLD AUTO: 8 FL (ref 6–12)
RBC # BLD AUTO: 4.87 10*6/MM3 (ref 4.14–5.8)
RH BLD: POSITIVE
T&S EXPIRATION DATE: NORMAL
WBC NRBC COR # BLD: 6.4 10*3/MM3 (ref 3.4–10.8)

## 2019-11-12 PROCEDURE — 93005 ELECTROCARDIOGRAM TRACING: CPT | Performed by: SURGERY

## 2019-11-12 PROCEDURE — 86850 RBC ANTIBODY SCREEN: CPT | Performed by: SURGERY

## 2019-11-12 PROCEDURE — 25010000002 FENTANYL CITRATE (PF) 100 MCG/2ML SOLUTION: Performed by: ANESTHESIOLOGY

## 2019-11-12 PROCEDURE — 25010000002 DEXAMETHASONE PER 1 MG: Performed by: ANESTHESIOLOGY

## 2019-11-12 PROCEDURE — 86900 BLOOD TYPING SEROLOGIC ABO: CPT | Performed by: SURGERY

## 2019-11-12 PROCEDURE — 25010000002 HYDROMORPHONE PER 4 MG: Performed by: SURGERY

## 2019-11-12 PROCEDURE — 85027 COMPLETE CBC AUTOMATED: CPT | Performed by: SURGERY

## 2019-11-12 PROCEDURE — 44626 REPAIR BOWEL OPENING: CPT | Performed by: SURGERY

## 2019-11-12 PROCEDURE — 86901 BLOOD TYPING SEROLOGIC RH(D): CPT | Performed by: SURGERY

## 2019-11-12 PROCEDURE — 0DBE0ZZ EXCISION OF LARGE INTESTINE, OPEN APPROACH: ICD-10-PCS | Performed by: SURGERY

## 2019-11-12 PROCEDURE — 86900 BLOOD TYPING SEROLOGIC ABO: CPT

## 2019-11-12 PROCEDURE — 25010000003 CEFAZOLIN PER 500 MG: Performed by: SURGERY

## 2019-11-12 PROCEDURE — 25010000002 NEOSTIGMINE 10 MG/10ML SOLUTION: Performed by: ANESTHESIOLOGY

## 2019-11-12 PROCEDURE — 25010000003 CEFAZOLIN PER 500 MG: Performed by: ANESTHESIOLOGY

## 2019-11-12 PROCEDURE — 25010000002 ONDANSETRON PER 1 MG: Performed by: ANESTHESIOLOGY

## 2019-11-12 PROCEDURE — 25010000002 HYDROMORPHONE PER 4 MG: Performed by: ANESTHESIOLOGY

## 2019-11-12 PROCEDURE — 86901 BLOOD TYPING SEROLOGIC RH(D): CPT

## 2019-11-12 PROCEDURE — 25010000002 PROPOFOL 10 MG/ML EMULSION: Performed by: ANESTHESIOLOGY

## 2019-11-12 PROCEDURE — 25010000002 PROMETHAZINE PER 50 MG: Performed by: ANESTHESIOLOGY

## 2019-11-12 DEVICE — CELLULAR STAPLER WITH TRI-STAPLE TECHNOLOGY
Type: IMPLANTABLE DEVICE | Site: SIGMOID COLON | Status: FUNCTIONAL
Brand: EEA

## 2019-11-12 DEVICE — PURSE STRING DEVICE
Type: IMPLANTABLE DEVICE | Site: SIGMOID COLON | Status: FUNCTIONAL
Brand: PURSTRING

## 2019-11-12 RX ORDER — CEFAZOLIN SODIUM 1 G/3ML
INJECTION, POWDER, FOR SOLUTION INTRAMUSCULAR; INTRAVENOUS AS NEEDED
Status: DISCONTINUED | OUTPATIENT
Start: 2019-11-12 | End: 2019-11-12 | Stop reason: SURG

## 2019-11-12 RX ORDER — PROMETHAZINE HYDROCHLORIDE 25 MG/1
25 SUPPOSITORY RECTAL ONCE AS NEEDED
Status: COMPLETED | OUTPATIENT
Start: 2019-11-12 | End: 2019-11-12

## 2019-11-12 RX ORDER — SODIUM CHLORIDE, SODIUM LACTATE, POTASSIUM CHLORIDE, CALCIUM CHLORIDE 600; 310; 30; 20 MG/100ML; MG/100ML; MG/100ML; MG/100ML
9 INJECTION, SOLUTION INTRAVENOUS CONTINUOUS PRN
Status: DISCONTINUED | OUTPATIENT
Start: 2019-11-12 | End: 2019-11-12 | Stop reason: HOSPADM

## 2019-11-12 RX ORDER — HYDROMORPHONE HCL 110MG/55ML
1 PATIENT CONTROLLED ANALGESIA SYRINGE INTRAVENOUS
Status: DISCONTINUED | OUTPATIENT
Start: 2019-11-12 | End: 2019-11-18 | Stop reason: HOSPADM

## 2019-11-12 RX ORDER — PROMETHAZINE HYDROCHLORIDE 25 MG/1
25 TABLET ORAL ONCE AS NEEDED
Status: COMPLETED | OUTPATIENT
Start: 2019-11-12 | End: 2019-11-12

## 2019-11-12 RX ORDER — DOCUSATE SODIUM 100 MG/1
100 CAPSULE, LIQUID FILLED ORAL 2 TIMES DAILY PRN
Status: DISCONTINUED | OUTPATIENT
Start: 2019-11-12 | End: 2019-11-18 | Stop reason: HOSPADM

## 2019-11-12 RX ORDER — SODIUM CHLORIDE 0.9 % (FLUSH) 0.9 %
3 SYRINGE (ML) INJECTION EVERY 12 HOURS SCHEDULED
Status: DISCONTINUED | OUTPATIENT
Start: 2019-11-12 | End: 2019-11-18 | Stop reason: HOSPADM

## 2019-11-12 RX ORDER — ONDANSETRON 2 MG/ML
4 INJECTION INTRAMUSCULAR; INTRAVENOUS EVERY 6 HOURS PRN
Status: DISCONTINUED | OUTPATIENT
Start: 2019-11-12 | End: 2019-11-18 | Stop reason: HOSPADM

## 2019-11-12 RX ORDER — NALOXONE HCL 0.4 MG/ML
0.4 VIAL (ML) INJECTION
Status: DISCONTINUED | OUTPATIENT
Start: 2019-11-12 | End: 2019-11-18 | Stop reason: HOSPADM

## 2019-11-12 RX ORDER — ROCURONIUM BROMIDE 10 MG/ML
INJECTION, SOLUTION INTRAVENOUS AS NEEDED
Status: DISCONTINUED | OUTPATIENT
Start: 2019-11-12 | End: 2019-11-12 | Stop reason: SURG

## 2019-11-12 RX ORDER — ONDANSETRON 2 MG/ML
INJECTION INTRAMUSCULAR; INTRAVENOUS AS NEEDED
Status: DISCONTINUED | OUTPATIENT
Start: 2019-11-12 | End: 2019-11-12 | Stop reason: SURG

## 2019-11-12 RX ORDER — SODIUM CHLORIDE 0.9 % (FLUSH) 0.9 %
3-10 SYRINGE (ML) INJECTION AS NEEDED
Status: DISCONTINUED | OUTPATIENT
Start: 2019-11-12 | End: 2019-11-12 | Stop reason: HOSPADM

## 2019-11-12 RX ORDER — TRAZODONE HYDROCHLORIDE 50 MG/1
25 TABLET ORAL NIGHTLY PRN
Status: DISCONTINUED | OUTPATIENT
Start: 2019-11-12 | End: 2019-11-18 | Stop reason: HOSPADM

## 2019-11-12 RX ORDER — ONDANSETRON 4 MG/1
4 TABLET, FILM COATED ORAL EVERY 6 HOURS PRN
Status: DISCONTINUED | OUTPATIENT
Start: 2019-11-12 | End: 2019-11-18 | Stop reason: HOSPADM

## 2019-11-12 RX ORDER — ONDANSETRON 2 MG/ML
4 INJECTION INTRAMUSCULAR; INTRAVENOUS ONCE AS NEEDED
Status: COMPLETED | OUTPATIENT
Start: 2019-11-12 | End: 2019-11-12

## 2019-11-12 RX ORDER — BACLOFEN 10 MG/1
10 TABLET ORAL 2 TIMES DAILY
Status: DISCONTINUED | OUTPATIENT
Start: 2019-11-13 | End: 2019-11-18 | Stop reason: HOSPADM

## 2019-11-12 RX ORDER — SODIUM CHLORIDE 0.9 % (FLUSH) 0.9 %
3 SYRINGE (ML) INJECTION EVERY 12 HOURS SCHEDULED
Status: DISCONTINUED | OUTPATIENT
Start: 2019-11-12 | End: 2019-11-12 | Stop reason: HOSPADM

## 2019-11-12 RX ORDER — NALOXONE HCL 0.4 MG/ML
0.1 VIAL (ML) INJECTION
Status: DISCONTINUED | OUTPATIENT
Start: 2019-11-12 | End: 2019-11-18 | Stop reason: HOSPADM

## 2019-11-12 RX ORDER — HYDROCODONE BITARTRATE AND ACETAMINOPHEN 10; 325 MG/1; MG/1
1 TABLET ORAL EVERY 4 HOURS PRN
Status: DISCONTINUED | OUTPATIENT
Start: 2019-11-12 | End: 2019-11-18 | Stop reason: HOSPADM

## 2019-11-12 RX ORDER — HYDROCODONE BITARTRATE AND ACETAMINOPHEN 7.5; 325 MG/1; MG/1
1 TABLET ORAL EVERY 6 HOURS PRN
Status: DISCONTINUED | OUTPATIENT
Start: 2019-11-12 | End: 2019-11-18 | Stop reason: HOSPADM

## 2019-11-12 RX ORDER — HYDROMORPHONE HCL 110MG/55ML
0.5 PATIENT CONTROLLED ANALGESIA SYRINGE INTRAVENOUS
Status: COMPLETED | OUTPATIENT
Start: 2019-11-12 | End: 2019-11-12

## 2019-11-12 RX ORDER — PROMETHAZINE HYDROCHLORIDE 25 MG/ML
6.25 INJECTION, SOLUTION INTRAMUSCULAR; INTRAVENOUS ONCE AS NEEDED
Status: COMPLETED | OUTPATIENT
Start: 2019-11-12 | End: 2019-11-12

## 2019-11-12 RX ORDER — DEXAMETHASONE SODIUM PHOSPHATE 4 MG/ML
INJECTION, SOLUTION INTRA-ARTICULAR; INTRALESIONAL; INTRAMUSCULAR; INTRAVENOUS; SOFT TISSUE AS NEEDED
Status: DISCONTINUED | OUTPATIENT
Start: 2019-11-12 | End: 2019-11-12 | Stop reason: SURG

## 2019-11-12 RX ORDER — LIDOCAINE HYDROCHLORIDE 20 MG/ML
INJECTION, SOLUTION EPIDURAL; INFILTRATION; INTRACAUDAL; PERINEURAL AS NEEDED
Status: DISCONTINUED | OUTPATIENT
Start: 2019-11-12 | End: 2019-11-12 | Stop reason: SURG

## 2019-11-12 RX ORDER — MORPHINE SULFATE 4 MG/ML
4 INJECTION, SOLUTION INTRAMUSCULAR; INTRAVENOUS
Status: DISCONTINUED | OUTPATIENT
Start: 2019-11-12 | End: 2019-11-18 | Stop reason: HOSPADM

## 2019-11-12 RX ORDER — PHENYLEPHRINE HCL IN 0.9% NACL 0.5 MG/5ML
SYRINGE (ML) INTRAVENOUS AS NEEDED
Status: DISCONTINUED | OUTPATIENT
Start: 2019-11-12 | End: 2019-11-12 | Stop reason: SURG

## 2019-11-12 RX ORDER — GABAPENTIN 300 MG/1
300 CAPSULE ORAL 3 TIMES DAILY
Status: DISCONTINUED | OUTPATIENT
Start: 2019-11-12 | End: 2019-11-18 | Stop reason: HOSPADM

## 2019-11-12 RX ORDER — FENTANYL CITRATE 50 UG/ML
INJECTION, SOLUTION INTRAMUSCULAR; INTRAVENOUS AS NEEDED
Status: DISCONTINUED | OUTPATIENT
Start: 2019-11-12 | End: 2019-11-12 | Stop reason: SURG

## 2019-11-12 RX ORDER — PROMETHAZINE HYDROCHLORIDE 25 MG/ML
12.5 INJECTION, SOLUTION INTRAMUSCULAR; INTRAVENOUS EVERY 6 HOURS PRN
Status: DISCONTINUED | OUTPATIENT
Start: 2019-11-12 | End: 2019-11-18 | Stop reason: HOSPADM

## 2019-11-12 RX ORDER — NEOSTIGMINE METHYLSULFATE 1 MG/ML
INJECTION, SOLUTION INTRAVENOUS AS NEEDED
Status: DISCONTINUED | OUTPATIENT
Start: 2019-11-12 | End: 2019-11-12 | Stop reason: SURG

## 2019-11-12 RX ORDER — GLYCOPYRROLATE 0.2 MG/ML
INJECTION INTRAMUSCULAR; INTRAVENOUS AS NEEDED
Status: DISCONTINUED | OUTPATIENT
Start: 2019-11-12 | End: 2019-11-12 | Stop reason: SURG

## 2019-11-12 RX ORDER — BISACODYL 5 MG/1
10 TABLET, DELAYED RELEASE ORAL DAILY PRN
Status: DISCONTINUED | OUTPATIENT
Start: 2019-11-12 | End: 2019-11-18 | Stop reason: HOSPADM

## 2019-11-12 RX ORDER — PROPOFOL 10 MG/ML
VIAL (ML) INTRAVENOUS AS NEEDED
Status: DISCONTINUED | OUTPATIENT
Start: 2019-11-12 | End: 2019-11-12 | Stop reason: SURG

## 2019-11-12 RX ORDER — SODIUM CHLORIDE 0.9 % (FLUSH) 0.9 %
3-10 SYRINGE (ML) INJECTION AS NEEDED
Status: DISCONTINUED | OUTPATIENT
Start: 2019-11-12 | End: 2019-11-18 | Stop reason: HOSPADM

## 2019-11-12 RX ORDER — DEXTROSE AND SODIUM CHLORIDE 5; .45 G/100ML; G/100ML
125 INJECTION, SOLUTION INTRAVENOUS CONTINUOUS
Status: DISCONTINUED | OUTPATIENT
Start: 2019-11-12 | End: 2019-11-16

## 2019-11-12 RX ORDER — SODIUM CHLORIDE 9 MG/ML
9 INJECTION, SOLUTION INTRAVENOUS CONTINUOUS PRN
Status: DISCONTINUED | OUTPATIENT
Start: 2019-11-12 | End: 2019-11-12 | Stop reason: HOSPADM

## 2019-11-12 RX ORDER — FAMOTIDINE 20 MG/1
20 TABLET, FILM COATED ORAL 2 TIMES DAILY
Status: DISCONTINUED | OUTPATIENT
Start: 2019-11-13 | End: 2019-11-18 | Stop reason: HOSPADM

## 2019-11-12 RX ORDER — ACETAMINOPHEN,DIPHENHYDRAMINE HCL 500; 25 MG/1; MG/1
1 TABLET, FILM COATED ORAL NIGHTLY PRN
COMMUNITY

## 2019-11-12 RX ORDER — HYDROCODONE BITARTRATE AND ACETAMINOPHEN 5; 325 MG/1; MG/1
1 TABLET ORAL EVERY 4 HOURS PRN
Status: DISCONTINUED | OUTPATIENT
Start: 2019-11-12 | End: 2019-11-18 | Stop reason: HOSPADM

## 2019-11-12 RX ADMIN — HYDROMORPHONE HYDROCHLORIDE 1 MG: 2 INJECTION, SOLUTION INTRAMUSCULAR; INTRAVENOUS; SUBCUTANEOUS at 15:49

## 2019-11-12 RX ADMIN — PHENYLEPHRINE HYDROCHLORIDE 100 MCG: 10 INJECTION INTRAVENOUS at 11:15

## 2019-11-12 RX ADMIN — SODIUM CHLORIDE, PRESERVATIVE FREE 3 ML: 5 INJECTION INTRAVENOUS at 21:14

## 2019-11-12 RX ADMIN — METRONIDAZOLE 500 MG: 500 INJECTION, SOLUTION INTRAVENOUS at 21:46

## 2019-11-12 RX ADMIN — SODIUM CHLORIDE 9 ML/HR: 900 INJECTION, SOLUTION INTRAVENOUS at 09:45

## 2019-11-12 RX ADMIN — CEFAZOLIN SODIUM 2 G: 1 INJECTION, POWDER, FOR SOLUTION INTRAMUSCULAR; INTRAVENOUS at 11:17

## 2019-11-12 RX ADMIN — ONDANSETRON 4 MG: 2 INJECTION INTRAMUSCULAR; INTRAVENOUS at 13:44

## 2019-11-12 RX ADMIN — HYDROMORPHONE HYDROCHLORIDE 0.5 MG: 2 INJECTION, SOLUTION INTRAMUSCULAR; INTRAVENOUS; SUBCUTANEOUS at 15:24

## 2019-11-12 RX ADMIN — HYDROCODONE BITARTRATE AND ACETAMINOPHEN 1 TABLET: 10; 325 TABLET ORAL at 21:04

## 2019-11-12 RX ADMIN — HYDROMORPHONE HYDROCHLORIDE 0.5 MG: 2 INJECTION, SOLUTION INTRAMUSCULAR; INTRAVENOUS; SUBCUTANEOUS at 13:45

## 2019-11-12 RX ADMIN — PROPOFOL 120 MG: 10 INJECTION, EMULSION INTRAVENOUS at 11:13

## 2019-11-12 RX ADMIN — CEFAZOLIN 1 G: 1 INJECTION, POWDER, FOR SOLUTION INTRAMUSCULAR; INTRAVENOUS at 21:10

## 2019-11-12 RX ADMIN — PHENYLEPHRINE HYDROCHLORIDE 100 MCG: 10 INJECTION INTRAVENOUS at 12:05

## 2019-11-12 RX ADMIN — LIDOCAINE HYDROCHLORIDE 25 MG: 20 INJECTION, SOLUTION EPIDURAL; INFILTRATION; INTRACAUDAL; PERINEURAL at 11:12

## 2019-11-12 RX ADMIN — NEOSTIGMINE METHYLSULFATE 2 MG: 1 INJECTION, SOLUTION INTRAVENOUS at 13:06

## 2019-11-12 RX ADMIN — DEXAMETHASONE SODIUM PHOSPHATE 8 MG: 4 INJECTION, SOLUTION INTRAMUSCULAR; INTRAVENOUS at 11:18

## 2019-11-12 RX ADMIN — HYDROMORPHONE HYDROCHLORIDE 0.5 MG: 2 INJECTION, SOLUTION INTRAMUSCULAR; INTRAVENOUS; SUBCUTANEOUS at 13:56

## 2019-11-12 RX ADMIN — ONDANSETRON 4 MG: 2 INJECTION INTRAMUSCULAR; INTRAVENOUS at 13:20

## 2019-11-12 RX ADMIN — HYDROMORPHONE HYDROCHLORIDE 1 MG: 2 INJECTION, SOLUTION INTRAMUSCULAR; INTRAVENOUS; SUBCUTANEOUS at 18:26

## 2019-11-12 RX ADMIN — METRONIDAZOLE 500 MG: 500 INJECTION, SOLUTION INTRAVENOUS at 11:18

## 2019-11-12 RX ADMIN — GABAPENTIN 300 MG: 300 CAPSULE ORAL at 21:04

## 2019-11-12 RX ADMIN — PHENYLEPHRINE HYDROCHLORIDE 100 MCG: 10 INJECTION INTRAVENOUS at 12:34

## 2019-11-12 RX ADMIN — FENTANYL CITRATE 100 MCG: 50 INJECTION, SOLUTION INTRAMUSCULAR; INTRAVENOUS at 11:05

## 2019-11-12 RX ADMIN — DEXTROSE AND SODIUM CHLORIDE 125 ML/HR: 5; 450 INJECTION, SOLUTION INTRAVENOUS at 15:49

## 2019-11-12 RX ADMIN — ROCURONIUM BROMIDE 30 MG: 10 INJECTION, SOLUTION INTRAVENOUS at 11:14

## 2019-11-12 RX ADMIN — DEXTROSE AND SODIUM CHLORIDE 125 ML/HR: 5; 450 INJECTION, SOLUTION INTRAVENOUS at 22:52

## 2019-11-12 RX ADMIN — HYDROMORPHONE HYDROCHLORIDE 0.5 MG: 2 INJECTION, SOLUTION INTRAMUSCULAR; INTRAVENOUS; SUBCUTANEOUS at 15:06

## 2019-11-12 RX ADMIN — PROMETHAZINE HYDROCHLORIDE 6.25 MG: 25 INJECTION INTRAMUSCULAR; INTRAVENOUS at 14:02

## 2019-11-12 RX ADMIN — ROCURONIUM BROMIDE 20 MG: 10 INJECTION, SOLUTION INTRAVENOUS at 11:52

## 2019-11-12 RX ADMIN — GLYCOPYRROLATE 0.2 MG: 0.2 INJECTION, SOLUTION INTRAMUSCULAR; INTRAVENOUS at 13:06

## 2019-11-12 NOTE — OP NOTE
Operative Note:    Patient Name:  Mamadou Romeo  YOB: 1945    Date of Surgery:  11/12/2019     Indications: Patient is a very pleasant 73-year-old gentleman with a history of previous diverting colostomy for perforated diverticulitis he presents for reversal today.    Pre-op Diagnosis:   DIVERTING COLOSTOMY       Post-Op Diagnosis Codes:  Same    Procedure/CPT® Codes:      Procedure(s):  COLOSTOMY TAKEDOWN OR CLOSURE    Staff:  Surgeon(s):  Carole Collins MD         Anesthesia: General    Estimated Blood Loss: minimal    Implants:    Implant Name Type Inv. Item Serial No.  Lot No. LRB No. Used   STPLR CIR EEA MD THK 28MM - [id number] Implant STPLR CIR EEA MD THK 28MM  COVIDIEN [id number] N/A 1   STPLR INT PURSTRING [id number] - [id number] Implant STPLR INT PURSTRING [id number]  COVIDIEN [id number] N/A 1       Specimen:          None        Findings: minmal adhesive disease    Complications: None    Description of Procedure: Patient was brought to the operating room and transferred to the operating table in the standard supine position.  The region of the abdomen was sterilely prepped and draped.  Patient was placed in the low Renzo stirrups in the anus and perineum were sterilely prepped and draped.  Prior to prep and drape the ostomy was oversewn..  A timeout was performed.  A midline incision was made in the abdominal cavity was entered.  Lysis of adhesions was performed and the small bowel was packed in the right upper quadrant wrapped in a wet blue towel.  I took down the colostomy using Bovie cautery.  A primary end-to-end stapled 28 mm EEA anastomosis was performed by inserting the proximal anvil into the proximal stump and closing this with a pursestring suture.  The stapler was then inserted into the anus and a primary anastomosis was performed.  Once anastomosis was performed air was insufflated into the rectum all the pelvis was filled with water no bubbling was noted indicating  an airtight anastomosis.  Additionally there were noted to be 2 very good donuts remove the stapler.  Irrigation was performed hemostasis was verified and we changed to clean set up.  I then placed a drain in the pelvis secured to the skin with 2-0 silk irrigated with irricept and closed the fascia with interrupted #1 Vicryl in a simple interrupted figure-of-eight fashion and skin was closed with staples.  Tolerated the procedure well.  Next    Sponges counts correct x2.      Carole Collins MD     Date: 11/12/2019  Time: 12:39 PM

## 2019-11-12 NOTE — ANESTHESIA POSTPROCEDURE EVALUATION
Patient: Mamadou Romeo    Procedure Summary     Date:  11/12/19 Room / Location:  [facility] OR 09 / [facility] MAIN OR    Anesthesia Start:  1105 Anesthesia Stop:      Procedure:  COLOSTOMY TAKEDOWN OR CLOSURE (N/A Abdomen) Diagnosis:  (DIVERTING COLOSTOMY)    Surgeon:  Carole Collins MD Provider:  Alex Albert MD    Anesthesia Type:  general ASA Status:  3          Anesthesia Type: general  Last vitals  BP   155/77 (11/12/19 1329)   Temp   96.8 °F (36 °C) (11/12/19 1324)   Pulse   67 (11/12/19 1329)   Resp   17 (11/12/19 1329)     SpO2   100 % (11/12/19 1329)     Post Anesthesia Care and Evaluation    Patient location during evaluation: ICU  Patient participation: complete - patient cannot participate  Level of consciousness: obtunded/minimal responses  Pain scale: See nurse's notes for pain score.  Pain management: adequate  Airway patency: patent  Anesthetic complications: No anesthetic complications  PONV Status: none  Cardiovascular status: acceptable  Respiratory status: acceptable  Hydration status: acceptable    Comments: Patient seen and examined postoperatively; vital signs stable; SpO2 greater than or equal to 90%; cardiopulmonary status stable; nausea/vomiting adequately controlled; pain adequately controlled; no apparent anesthesia complications; patient discharged from anesthesia care when discharge criteria were met

## 2019-11-12 NOTE — ANESTHESIA PREPROCEDURE EVALUATION
Anesthesia Evaluation     Patient summary reviewed and Nursing notes reviewed   NPO Solid Status: > 6 hours  NPO Liquid Status: > 6 hours           Airway   Mallampati: II  TM distance: >3 FB  Neck ROM: full  No difficulty expected  Dental    (+) poor dentition    Pulmonary - negative pulmonary ROS and normal exam    breath sounds clear to auscultation  Cardiovascular - negative cardio ROS and normal exam    ECG reviewed  Rhythm: regular  Rate: normal        Neuro/Psych- negative ROS  GI/Hepatic/Renal/Endo    (+)  GERD,      Musculoskeletal     Abdominal  - normal exam   Substance History - negative use     OB/GYN          Other   arthritis,    history of cancer    ROS/Med Hx Other: No difficulty with anesth 4/14/19  All all questions or concerns addresseed  EKG pending                Anesthesia Plan    ASA 3     general     intravenous induction     Anesthetic plan, all risks, benefits, and alternatives have been provided, discussed and informed consent has been obtained with: patient.  Use of blood products discussed with patient .

## 2019-11-12 NOTE — CONSULTS
Patient Care Team:  Tyron Ruffin MD as PCP - General (Family Medicine)    Chief complaint status post colostomy    Subjective     Patient is a very pleasant 73-year-old gentleman who presents for colostomy takedown.  He is a planned postop admission.    History  Past Medical History:   Diagnosis Date   • Arthritis    • Bowel trouble    • Cancer (CMS/HCC)     Skin   • GERD (gastroesophageal reflux disease)    • Insomnia    • Muscle spasms of lower extremity      Past Surgical History:   Procedure Laterality Date   • ABDOMINAL SURGERY     • BACK SURGERY     • CHOLECYSTECTOMY  2018   • COLON SURGERY     • SKIN BIOPSY       History reviewed. No pertinent family history.  Social History     Tobacco Use   • Smoking status: Former Smoker   • Smokeless tobacco: Never Used   • Tobacco comment: quit 30 yrs ago   Substance Use Topics   • Alcohol use: No     Frequency: Never   • Drug use: No     Medications Prior to Admission   Medication Sig Dispense Refill Last Dose   • baclofen (LIORESAL) 10 MG tablet Take 1 tablet by mouth 2 (Two) Times a Day. 60 tablet 3 Past Week at Unknown time   • diphenhydrAMINE-acetaminophen (TYLENOL PM)  MG tablet per tablet Take 1 tablet by mouth At Night As Needed for Sleep.   Past Week at Unknown time   • docusate calcium (SURFAK) 240 MG capsule Take 240 mg by mouth 2 (Two) Times a Day.   Past Week at Unknown time   • gabapentin (NEURONTIN) 300 MG capsule Take 1 capsule by mouth 3 (Three) Times a Day. 90 capsule 0 11/11/2019 at Unknown time   • HYDROcodone-acetaminophen (NORCO) 7.5-325 MG per tablet Take 1 tablet by mouth Every 6 (Six) Hours As Needed for Moderate Pain . 28 tablet 0 Past Week at Unknown time   • MULTIPLE VITAMIN PO Take 1 tablet by mouth Daily.   Past Week at Unknown time   • omeprazole (priLOSEC) 40 MG capsule Take 40 mg by mouth Daily.   11/11/2019 at Unknown time   • traZODone (DESYREL) 50 MG tablet TAKE 1 TABLET (50 MG) BY MOUTH AT BEDTIME  3 Past Month at  Unknown time     Allergies:  Terbinafine and Shellfish allergy    Review of Systems     Objective     Vital Signs  Temp:  [98 °F (36.7 °C)] 98 °F (36.7 °C)  Heart Rate:  [70] 70  Resp:  [15] 15  BP: (144)/(80) 144/80    Physical Exam   Constitutional: He is oriented to person, place, and time. He appears well-developed and well-nourished.   HENT:   Head: Normocephalic and atraumatic.   Eyes: EOM are normal.   Neck: Normal range of motion.   Cardiovascular: Normal rate.   Pulmonary/Chest: Effort normal.   Abdominal: Soft.   Left upper quadrant ostomy in place   Genitourinary:   Genitourinary Comments: Deferred   Musculoskeletal: Normal range of motion.   Neurological: He is alert and oriented to person, place, and time.   Skin: Skin is warm and dry.   Psychiatric: He has a normal mood and affect.       Results Review:  Lab Results (last 24 hours)     Procedure Component Value Units Date/Time    CBC (No Diff) [[id number]]  (Normal) Collected:  11/12/19 0933    Specimen:  Blood Updated:  11/12/19 0949     WBC 6.40 10*3/mm3      RBC 4.87 10*6/mm3      Hemoglobin 14.5 g/dL      Hematocrit 42.7 %      MCV 87.8 fL      MCH 29.8 pg      MCHC 34.0 g/dL      RDW 14.4 %      RDW-SD 45.1 fl      MPV 8.0 fL      Platelets 270 10*3/mm3         Imaging Results (Last 24 Hours)     ** No results found for the last 24 hours. **          I reviewed the patient's other test results and agree with the interpretation  I reviewed the patient's new imaging results and agree with the interpretation.    Assessment/Plan     Active Problems:    History of colostomy reversal      We will plan for colostomy takedown and plan postop admission.  Risk of general anesthesia bleeding infection and injury to surrounding structures of all been discussed at length with the patient in the office all questions were answered.    I discussed the patients findings and my recommendations with patient.     Carole Collins MD  11/12/19  10:48 AM

## 2019-11-13 LAB
ANION GAP SERPL CALCULATED.3IONS-SCNC: 11 MMOL/L (ref 5–15)
BASOPHILS # BLD AUTO: 0.1 10*3/MM3 (ref 0–0.2)
BASOPHILS NFR BLD AUTO: 0.5 % (ref 0–1.5)
BUN BLD-MCNC: 10 MG/DL (ref 8–23)
BUN/CREAT SERPL: 11.9 (ref 7–25)
CALCIUM SPEC-SCNC: 8.7 MG/DL (ref 8.6–10.5)
CHLORIDE SERPL-SCNC: 104 MMOL/L (ref 98–107)
CO2 SERPL-SCNC: 24 MMOL/L (ref 22–29)
CREAT BLD-MCNC: 0.84 MG/DL (ref 0.76–1.27)
DEPRECATED RDW RBC AUTO: 45.9 FL (ref 37–54)
EOSINOPHIL # BLD AUTO: 0 10*3/MM3 (ref 0–0.4)
EOSINOPHIL NFR BLD AUTO: 0.2 % (ref 0.3–6.2)
ERYTHROCYTE [DISTWIDTH] IN BLOOD BY AUTOMATED COUNT: 14.4 % (ref 12.3–15.4)
GFR SERPL CREATININE-BSD FRML MDRD: 89 ML/MIN/1.73
GLUCOSE BLD-MCNC: 131 MG/DL (ref 65–99)
HCT VFR BLD AUTO: 38.6 % (ref 37.5–51)
HGB BLD-MCNC: 12.5 G/DL (ref 13–17.7)
LYMPHOCYTES # BLD AUTO: 0.9 10*3/MM3 (ref 0.7–3.1)
LYMPHOCYTES NFR BLD AUTO: 5.3 % (ref 19.6–45.3)
MCH RBC QN AUTO: 29 PG (ref 26.6–33)
MCHC RBC AUTO-ENTMCNC: 32.4 G/DL (ref 31.5–35.7)
MCV RBC AUTO: 89.7 FL (ref 79–97)
MONOCYTES # BLD AUTO: 1.6 10*3/MM3 (ref 0.1–0.9)
MONOCYTES NFR BLD AUTO: 9.3 % (ref 5–12)
NEUTROPHILS # BLD AUTO: 14.9 10*3/MM3 (ref 1.7–7)
NEUTROPHILS NFR BLD AUTO: 84.7 % (ref 42.7–76)
NRBC BLD AUTO-RTO: 0.1 /100 WBC (ref 0–0.2)
PLATELET # BLD AUTO: 258 10*3/MM3 (ref 140–450)
PMV BLD AUTO: 8.1 FL (ref 6–12)
POTASSIUM BLD-SCNC: 4.1 MMOL/L (ref 3.5–5.2)
RBC # BLD AUTO: 4.31 10*6/MM3 (ref 4.14–5.8)
SODIUM BLD-SCNC: 139 MMOL/L (ref 136–145)
WBC NRBC COR # BLD: 17.5 10*3/MM3 (ref 3.4–10.8)

## 2019-11-13 PROCEDURE — 25010000002 ENOXAPARIN PER 10 MG: Performed by: SURGERY

## 2019-11-13 PROCEDURE — 25010000002 HYDROMORPHONE PER 4 MG: Performed by: SURGERY

## 2019-11-13 PROCEDURE — 80048 BASIC METABOLIC PNL TOTAL CA: CPT | Performed by: SURGERY

## 2019-11-13 PROCEDURE — 25010000003 CEFAZOLIN PER 500 MG: Performed by: SURGERY

## 2019-11-13 PROCEDURE — 85025 COMPLETE CBC W/AUTO DIFF WBC: CPT | Performed by: SURGERY

## 2019-11-13 RX ADMIN — FAMOTIDINE 20 MG: 20 TABLET ORAL at 20:31

## 2019-11-13 RX ADMIN — BACLOFEN 10 MG: 10 TABLET ORAL at 20:31

## 2019-11-13 RX ADMIN — HYDROCODONE BITARTRATE AND ACETAMINOPHEN 1 TABLET: 10; 325 TABLET ORAL at 00:44

## 2019-11-13 RX ADMIN — GABAPENTIN 300 MG: 300 CAPSULE ORAL at 08:20

## 2019-11-13 RX ADMIN — BACLOFEN 10 MG: 10 TABLET ORAL at 08:20

## 2019-11-13 RX ADMIN — HYDROCODONE BITARTRATE AND ACETAMINOPHEN 1 TABLET: 10; 325 TABLET ORAL at 13:07

## 2019-11-13 RX ADMIN — ENOXAPARIN SODIUM 40 MG: 40 INJECTION SUBCUTANEOUS at 15:29

## 2019-11-13 RX ADMIN — METRONIDAZOLE 500 MG: 500 INJECTION, SOLUTION INTRAVENOUS at 03:37

## 2019-11-13 RX ADMIN — ONDANSETRON HYDROCHLORIDE 4 MG: 4 TABLET, FILM COATED ORAL at 18:07

## 2019-11-13 RX ADMIN — FAMOTIDINE 20 MG: 20 TABLET ORAL at 08:19

## 2019-11-13 RX ADMIN — ONDANSETRON HYDROCHLORIDE 4 MG: 4 TABLET, FILM COATED ORAL at 13:07

## 2019-11-13 RX ADMIN — HYDROCODONE BITARTRATE AND ACETAMINOPHEN 1 TABLET: 10; 325 TABLET ORAL at 18:07

## 2019-11-13 RX ADMIN — HYDROCODONE BITARTRATE AND ACETAMINOPHEN 1 TABLET: 10; 325 TABLET ORAL at 08:21

## 2019-11-13 RX ADMIN — GABAPENTIN 300 MG: 300 CAPSULE ORAL at 15:29

## 2019-11-13 RX ADMIN — SODIUM CHLORIDE, PRESERVATIVE FREE 3 ML: 5 INJECTION INTRAVENOUS at 08:19

## 2019-11-13 RX ADMIN — CEFAZOLIN 1 G: 1 INJECTION, POWDER, FOR SOLUTION INTRAMUSCULAR; INTRAVENOUS at 05:39

## 2019-11-13 RX ADMIN — DEXTROSE AND SODIUM CHLORIDE 125 ML/HR: 5; 450 INJECTION, SOLUTION INTRAVENOUS at 22:26

## 2019-11-13 RX ADMIN — HYDROMORPHONE HYDROCHLORIDE 1 MG: 2 INJECTION, SOLUTION INTRAMUSCULAR; INTRAVENOUS; SUBCUTANEOUS at 08:21

## 2019-11-13 RX ADMIN — GABAPENTIN 300 MG: 300 CAPSULE ORAL at 20:31

## 2019-11-13 NOTE — PROGRESS NOTES
Discharge Planning Assessment  [facility]     Patient Name: Mamadou Romeo  MRN: [id number]  Today's Date: 11/13/2019    Admit Date: 11/12/2019    Discharge Needs Assessment     Row Name 11/13/19 1340       Living Environment    Lives With  spouse    Current Living Arrangements  home/apartment/condo    Primary Care Provided by  spouse/significant other    Provides Primary Care For  no one, unable/limited ability to care for self    Caregiving Concerns  must be able to transfer into wheel chair with assist from wife    Family Caregiver if Needed  spouse    Quality of Family Relationships  helpful    Able to Return to Prior Arrangements  yes       Resource/Environmental Concerns    Resource/Environmental Concerns  none    Transportation Concerns  car, none       Transition Planning    Patient/Family Anticipates Transition to  inpatient rehabilitation facility;home with help/services    Patient/Family Anticipated Services at Transition  home health care;skilled nursing;rehabilitation services    Transportation Anticipated  family or friend will provide       Discharge Needs Assessment    Readmission Within the Last 30 Days  no previous admission in last 30 days    Concerns to be Addressed  discharge planning    Equipment Currently Used at Home  bath bench;wheelchair;shower chair;other (see comments) sliding board    Anticipated Changes Related to Illness  inability to care for self    Equipment Needed After Discharge  none    Outpatient/Agency/Support Group Needs  skilled nursing facility;inpatient rehabilitation facility    Discharge Facility/Level of Care Needs  nursing facility, skilled;home with home health    Discharge Coordination/Progress  spoke with patient at bedside. PT eval ordered and pending recommendations        Discharge Plan     Row Name 11/13/19 1342       Plan    Plan  Pending pt recommendations    Plan Comments  see assessment          Demographic Summary     Row Name 11/13/19 4644       General  Information    Admission Type  inpatient    Arrived From  operating room    Referral Source  admission list    Reason for Consult  discharge planning    Preferred Language  English        Functional Status     Row Name 11/13/19 4536       Functional Status    Usual Activity Tolerance  fair    Current Activity Tolerance  poor    Functional Status Comments  usues a wheel chair at home but transfers self with wife's help       Functional Status, IADL    Medications  assistive equipment and person    Meal Preparation  assistive equipment and person    Housekeeping  assistive equipment and person    Laundry  assistive equipment and person    Shopping  assistive equipment and person       Mental Status    General Appearance WDL  WDL       Mental Status Summary    Recent Changes in Mental Status/Cognitive Functioning  no changes       Employment/    Employment Status  disabled;retired          Cheyenne Devries RN

## 2019-11-13 NOTE — PROGRESS NOTES
General Surgery Progress Note     LOS: 1 day   Patient Care Team:  Tyron Ruffin MD as PCP - General (Family Medicine)    Subjective     No chief complaint on file.      Interval History:   Patient has quite a bit of pain today.  Not nauseated.  He has not gotten out of bed but has poor use of his legs.    Objective     Vital Signs  Temp:  [98.4 °F (36.9 °C)-100 °F (37.8 °C)] 99.6 °F (37.6 °C)  Heart Rate:  [72-98] 75  Resp:  [14-16] 16  BP: (113-153)/(65-83) 137/74    Physical Exam   Abdominal:   Soft.  Incision clean and dry.  RADHIKA serosanguineous.       Review of Systems     Results Review:    Lab Results (last 24 hours)     Procedure Component Value Units Date/Time    Basic Metabolic Panel [[id number]]  (Abnormal) Collected:  11/13/19 0356    Specimen:  Blood Updated:  11/13/19 0428     Glucose 131 mg/dL      BUN 10 mg/dL      Creatinine 0.84 mg/dL      Sodium 139 mmol/L      Potassium 4.1 mmol/L      Chloride 104 mmol/L      CO2 24.0 mmol/L      Calcium 8.7 mg/dL      eGFR Non African Amer 89 mL/min/1.73      BUN/Creatinine Ratio 11.9     Anion Gap 11.0 mmol/L     Narrative:       GFR Normal >60  Chronic Kidney Disease <60  Kidney Failure <15    CBC & Differential [[id number]] Collected:  11/13/19 0356    Specimen:  Blood Updated:  11/13/19 0420    Narrative:       The following orders were created for panel order CBC & Differential.  Procedure                               Abnormality         Status                     ---------                               -----------         ------                     CBC Auto Differential[[id number]]        Abnormal            Final result                 Please view results for these tests on the individual orders.    CBC Auto Differential [[id number]]  (Abnormal) Collected:  11/13/19 0356    Specimen:  Blood Updated:  11/13/19 0420     WBC 17.50 10*3/mm3      RBC 4.31 10*6/mm3      Hemoglobin 12.5 g/dL      Hematocrit 38.6 %      MCV 89.7 fL      MCH 29.0 pg      MCHC 32.4  g/dL      RDW 14.4 %      RDW-SD 45.9 fl      MPV 8.1 fL      Platelets 258 10*3/mm3      Neutrophil % 84.7 %      Lymphocyte % 5.3 %      Monocyte % 9.3 %      Eosinophil % 0.2 %      Basophil % 0.5 %      Neutrophils, Absolute 14.90 10*3/mm3      Lymphocytes, Absolute 0.90 10*3/mm3      Monocytes, Absolute 1.60 10*3/mm3      Eosinophils, Absolute 0.00 10*3/mm3      Basophils, Absolute 0.10 10*3/mm3      nRBC 0.1 /100 WBC         Imaging Results (Last 24 Hours)     ** No results found for the last 24 hours. **          I reviewed the patient's new clinical results.    Medication Review:    Current Facility-Administered Medications:   •  baclofen (LIORESAL) tablet 10 mg, 10 mg, Oral, BID, Carole Collins MD, 10 mg at 11/13/19 0820  •  bisacodyl (DULCOLAX) EC tablet 10 mg, 10 mg, Oral, Daily PRN, Carole Collins MD  •  dextrose 5 % and sodium chloride 0.45 % infusion, 125 mL/hr, Intravenous, Continuous, Carole Collins MD, Stopped at 11/13/19 0540  •  docusate sodium (COLACE) capsule 100 mg, 100 mg, Oral, BID PRN, Carole Collins MD  •  enoxaparin (LOVENOX) syringe 40 mg, 40 mg, Subcutaneous, Daily, Carole Collins MD, 40 mg at 11/13/19 1529  •  famotidine (PEPCID) tablet 20 mg, 20 mg, Oral, BID, Carole Collins MD, 20 mg at 11/13/19 0819  •  gabapentin (NEURONTIN) capsule 300 mg, 300 mg, Oral, TID, Carole Collins MD, 300 mg at 11/13/19 1529  •  HYDROcodone-acetaminophen (NORCO)  MG per tablet 1 tablet, 1 tablet, Oral, Q4H PRN, Carole Collins MD, 1 tablet at 11/13/19 1307  •  HYDROcodone-acetaminophen (NORCO) 5-325 MG per tablet 1 tablet, 1 tablet, Oral, Q4H PRN, Carole Collins MD  •  HYDROcodone-acetaminophen (NORCO) 7.5-325 MG per tablet 1 tablet, 1 tablet, Oral, Q6H PRN, Carole Collins MD  •  HYDROmorphone (DILAUDID) injection 1 mg, 1 mg, Intravenous, Q2H PRN, 1 mg at 11/13/19 0821 **AND** naloxone (NARCAN) injection 0.1 mg, 0.1 mg, Intravenous, Q5 Min PRN, Carole Collins MD  •   magnesium hydroxide (MILK OF MAGNESIA) suspension 2400 mg/10mL 10 mL, 10 mL, Oral, Daily PRN, Carole Collins MD  •  Morphine sulfate (PF) injection 4 mg, 4 mg, Intravenous, Q2H PRN **AND** naloxone (NARCAN) injection 0.4 mg, 0.4 mg, Intravenous, Q5 Min PRN, Carole Collins MD  •  ondansetron (ZOFRAN) tablet 4 mg, 4 mg, Oral, Q6H PRN, 4 mg at 11/13/19 1307 **OR** ondansetron (ZOFRAN) injection 4 mg, 4 mg, Intravenous, Q6H PRN, Carole Collins MD  •  promethazine (PHENERGAN) 12.5 mg in sodium chloride 0.9 % 50 mL, 12.5 mg, Intravenous, Q6H PRN **OR** promethazine (PHENERGAN) injection 12.5 mg, 12.5 mg, Intramuscular, Q6H PRN, Carole Collins MD  •  sodium chloride 0.9 % flush 3 mL, 3 mL, Intravenous, Q12H, Carole Collins MD, 3 mL at 11/13/19 0819  •  sodium chloride 0.9 % flush 3-10 mL, 3-10 mL, Intravenous, PRN, Carole Collins MD  •  traZODone (DESYREL) tablet 25 mg, 25 mg, Oral, Nightly PRN, Carole Collins MD    Assessment/Plan     Active Problems:    * No active hospital problems. *      Status post colostomy reversal- doing as expected.  Will DC Dugan catheter.  Continue clear liquids until GI function returns.    Plan for disposition: Home when fully recovered.    Carole Collins MD  11/13/19  3:36 PM

## 2019-11-13 NOTE — PLAN OF CARE
Problem: Patient Care Overview  Goal: Plan of Care Review  Outcome: Ongoing (interventions implemented as appropriate)    Goal: Individualization and Mutuality  Outcome: Ongoing (interventions implemented as appropriate)    Goal: Discharge Needs Assessment  Outcome: Ongoing (interventions implemented as appropriate)    Goal: Interprofessional Rounds/Family Conf  Outcome: Ongoing (interventions implemented as appropriate)      Problem: Skin Injury Risk (Adult)  Goal: Identify Related Risk Factors and Signs and Symptoms  Outcome: Ongoing (interventions implemented as appropriate)    Goal: Skin Health and Integrity  Outcome: Ongoing (interventions implemented as appropriate)      Problem: Pain, Acute (Adult)  Goal: Identify Related Risk Factors and Signs and Symptoms  Outcome: Ongoing (interventions implemented as appropriate)    Goal: Acceptable Pain Control/Comfort Level  Outcome: Ongoing (interventions implemented as appropriate)

## 2019-11-13 NOTE — PLAN OF CARE
Problem: Pain, Acute (Adult)  Goal: Identify Related Risk Factors and Signs and Symptoms  Outcome: Ongoing (interventions implemented as appropriate)   11/13/19 0554   Pain, Acute (Adult)   Related Risk Factors (Acute Pain) surgery   Signs and Symptoms (Acute Pain) verbalization of pain descriptors     Goal: Acceptable Pain Control/Comfort Level  Outcome: Ongoing (interventions implemented as appropriate)   11/13/19 0554   Pain, Acute (Adult)   Acceptable Pain Control/Comfort Level making progress toward outcome

## 2019-11-13 NOTE — H&P
Patient Care Team:  Tyron Ruffin MD as PCP - General (Family Medicine)    Chief complaint history of diverting colostomy ready for reversal.    Subjective     Patient is here for planned colostomy reversal.    History  Past Medical History:   Diagnosis Date   • Arthritis    • Bowel trouble    • Cancer (CMS/HCC)     Skin   • GERD (gastroesophageal reflux disease)    • Insomnia    • Muscle spasms of lower extremity      Past Surgical History:   Procedure Laterality Date   • ABDOMINAL SURGERY     • BACK SURGERY     • CHOLECYSTECTOMY  2018   • COLON SURGERY     • COLOSTOMY CLOSURE N/A 11/12/2019    Procedure: COLOSTOMY TAKEDOWN OR CLOSURE;  Surgeon: Carole Collins MD;  Location: [facility] MAIN OR;  Service: General   • SKIN BIOPSY       History reviewed. No pertinent family history.  Social History     Tobacco Use   • Smoking status: Former Smoker   • Smokeless tobacco: Never Used   • Tobacco comment: quit 30 yrs ago   Substance Use Topics   • Alcohol use: No     Frequency: Never   • Drug use: No     Medications Prior to Admission   Medication Sig Dispense Refill Last Dose   • baclofen (LIORESAL) 10 MG tablet Take 1 tablet by mouth 2 (Two) Times a Day. 60 tablet 3 Past Week at Unknown time   • diphenhydrAMINE-acetaminophen (TYLENOL PM)  MG tablet per tablet Take 1 tablet by mouth At Night As Needed for Sleep.   Past Week at Unknown time   • docusate calcium (SURFAK) 240 MG capsule Take 240 mg by mouth 2 (Two) Times a Day.   Past Week at Unknown time   • gabapentin (NEURONTIN) 300 MG capsule Take 1 capsule by mouth 3 (Three) Times a Day. 90 capsule 0 11/11/2019 at Unknown time   • HYDROcodone-acetaminophen (NORCO) 7.5-325 MG per tablet Take 1 tablet by mouth Every 6 (Six) Hours As Needed for Moderate Pain . 28 tablet 0 Past Week at Unknown time   • MULTIPLE VITAMIN PO Take 1 tablet by mouth Daily.   Past Week at Unknown time   • omeprazole (priLOSEC) 40 MG capsule Take 40 mg by mouth Daily.   11/11/2019 at  Unknown time   • traZODone (DESYREL) 50 MG tablet TAKE 1 TABLET (50 MG) BY MOUTH AT BEDTIME  3 Past Month at Unknown time     Allergies:  Terbinafine and Shellfish allergy    Review of Systems     Objective     Vital Signs  Temp:  [98.4 °F (36.9 °C)-100 °F (37.8 °C)] 99.6 °F (37.6 °C)  Heart Rate:  [72-98] 75  Resp:  [14-16] 16  BP: (113-137)/(65-83) 137/74    Physical Exam   Constitutional: He is oriented to person, place, and time. He appears well-developed and well-nourished.   HENT:   Head: Normocephalic and atraumatic.   Eyes: EOM are normal.   Neck: Normal range of motion.   Cardiovascular: Normal rate.   Pulmonary/Chest: Effort normal.   Abdominal: Soft.   Upper quadrant ostomy in place   Genitourinary:   Genitourinary Comments: Deferred   Musculoskeletal: Normal range of motion.   Neurological: He is alert and oriented to person, place, and time.   Skin: Skin is warm and dry.   Psychiatric: He has a normal mood and affect.       Results Review:  Lab Results (last 24 hours)     Procedure Component Value Units Date/Time    Basic Metabolic Panel [[id number]]  (Abnormal) Collected:  11/13/19 0356    Specimen:  Blood Updated:  11/13/19 0428     Glucose 131 mg/dL      BUN 10 mg/dL      Creatinine 0.84 mg/dL      Sodium 139 mmol/L      Potassium 4.1 mmol/L      Chloride 104 mmol/L      CO2 24.0 mmol/L      Calcium 8.7 mg/dL      eGFR Non African Amer 89 mL/min/1.73      BUN/Creatinine Ratio 11.9     Anion Gap 11.0 mmol/L     Narrative:       GFR Normal >60  Chronic Kidney Disease <60  Kidney Failure <15    CBC & Differential [[id number]] Collected:  11/13/19 0356    Specimen:  Blood Updated:  11/13/19 0420    Narrative:       The following orders were created for panel order CBC & Differential.  Procedure                               Abnormality         Status                     ---------                               -----------         ------                     CBC Auto Differential[[id number]]        Abnormal             Final result                 Please view results for these tests on the individual orders.    CBC Auto Differential [[id number]]  (Abnormal) Collected:  11/13/19 0356    Specimen:  Blood Updated:  11/13/19 0420     WBC 17.50 10*3/mm3      RBC 4.31 10*6/mm3      Hemoglobin 12.5 g/dL      Hematocrit 38.6 %      MCV 89.7 fL      MCH 29.0 pg      MCHC 32.4 g/dL      RDW 14.4 %      RDW-SD 45.9 fl      MPV 8.1 fL      Platelets 258 10*3/mm3      Neutrophil % 84.7 %      Lymphocyte % 5.3 %      Monocyte % 9.3 %      Eosinophil % 0.2 %      Basophil % 0.5 %      Neutrophils, Absolute 14.90 10*3/mm3      Lymphocytes, Absolute 0.90 10*3/mm3      Monocytes, Absolute 1.60 10*3/mm3      Eosinophils, Absolute 0.00 10*3/mm3      Basophils, Absolute 0.10 10*3/mm3      nRBC 0.1 /100 WBC         Imaging Results (Last 24 Hours)     ** No results found for the last 24 hours. **          I reviewed the patient's other test results and agree with the interpretation  I reviewed the patient's new imaging results and agree with the interpretation.    Assessment/Plan     Active Problems:    * No active hospital problems. *      Status post diverting colostomy for perforated diverticulitis-plan colostomy reversal today all questions and preop discussion is been had with the patient in the office.    I discussed the patients findings and my recommendations with patient.     Carole Collins MD  11/13/19  4:38 PM

## 2019-11-13 NOTE — PLAN OF CARE
Problem: Patient Care Overview  Goal: Plan of Care Review  Outcome: Ongoing (interventions implemented as appropriate)   11/13/19 0552   Coping/Psychosocial   Plan of Care Reviewed With patient;spouse   Plan of Care Review   Progress no change   OTHER   Outcome Summary surgery complete, iv abx, prn pain meds, bsc for toileting     Goal: Discharge Needs Assessment  Outcome: Ongoing (interventions implemented as appropriate)   11/13/19 0552   Discharge Needs Assessment   Readmission Within the Last 30 Days no previous admission in last 30 days   Concerns to be Addressed denies needs/concerns at this time   Patient/Family Anticipates Transition to home with family   Transportation Anticipated family or friend will provide       Problem: Skin Injury Risk (Adult)  Goal: Identify Related Risk Factors and Signs and Symptoms  Outcome: Ongoing (interventions implemented as appropriate)   11/13/19 0552   Skin Injury Risk (Adult)   Related Risk Factors (Skin Injury Risk) advanced age;mobility impaired     Goal: Skin Health and Integrity  Outcome: Ongoing (interventions implemented as appropriate)   11/13/19 0552   Skin Injury Risk (Adult)   Skin Health and Integrity making progress toward outcome

## 2019-11-14 LAB
BASOPHILS # BLD AUTO: 0.2 10*3/MM3 (ref 0–0.2)
BASOPHILS NFR BLD AUTO: 1 % (ref 0–1.5)
DEPRECATED RDW RBC AUTO: 45.1 FL (ref 37–54)
EOSINOPHIL # BLD AUTO: 0.7 10*3/MM3 (ref 0–0.4)
EOSINOPHIL NFR BLD AUTO: 4.5 % (ref 0.3–6.2)
ERYTHROCYTE [DISTWIDTH] IN BLOOD BY AUTOMATED COUNT: 14.3 % (ref 12.3–15.4)
HCT VFR BLD AUTO: 34.2 % (ref 37.5–51)
HGB BLD-MCNC: 11.8 G/DL (ref 13–17.7)
LYMPHOCYTES # BLD AUTO: 1.5 10*3/MM3 (ref 0.7–3.1)
LYMPHOCYTES NFR BLD AUTO: 10.4 % (ref 19.6–45.3)
MCH RBC QN AUTO: 30.6 PG (ref 26.6–33)
MCHC RBC AUTO-ENTMCNC: 34.4 G/DL (ref 31.5–35.7)
MCV RBC AUTO: 89 FL (ref 79–97)
MONOCYTES # BLD AUTO: 1 10*3/MM3 (ref 0.1–0.9)
MONOCYTES NFR BLD AUTO: 6.7 % (ref 5–12)
NEUTROPHILS # BLD AUTO: 11.3 10*3/MM3 (ref 1.7–7)
NEUTROPHILS NFR BLD AUTO: 77.4 % (ref 42.7–76)
NRBC BLD AUTO-RTO: 0.1 /100 WBC (ref 0–0.2)
PLATELET # BLD AUTO: 201 10*3/MM3 (ref 140–450)
PMV BLD AUTO: 8.7 FL (ref 6–12)
RBC # BLD AUTO: 3.85 10*6/MM3 (ref 4.14–5.8)
RBC MORPH BLD: NORMAL
SMALL PLATELETS BLD QL SMEAR: ADEQUATE
WBC MORPH BLD: NORMAL
WBC NRBC COR # BLD: 14.5 10*3/MM3 (ref 3.4–10.8)

## 2019-11-14 PROCEDURE — 97530 THERAPEUTIC ACTIVITIES: CPT

## 2019-11-14 PROCEDURE — 97166 OT EVAL MOD COMPLEX 45 MIN: CPT

## 2019-11-14 PROCEDURE — 97162 PT EVAL MOD COMPLEX 30 MIN: CPT

## 2019-11-14 PROCEDURE — 85007 BL SMEAR W/DIFF WBC COUNT: CPT | Performed by: SURGERY

## 2019-11-14 PROCEDURE — 25010000002 ONDANSETRON PER 1 MG: Performed by: SURGERY

## 2019-11-14 PROCEDURE — 25010000002 PIPERACILLIN SOD-TAZOBACTAM PER 1 G: Performed by: SURGERY

## 2019-11-14 PROCEDURE — 85025 COMPLETE CBC W/AUTO DIFF WBC: CPT | Performed by: SURGERY

## 2019-11-14 PROCEDURE — 25010000002 HYDROMORPHONE PER 4 MG: Performed by: SURGERY

## 2019-11-14 PROCEDURE — 25010000002 ENOXAPARIN PER 10 MG: Performed by: SURGERY

## 2019-11-14 RX ORDER — SUCRALFATE 1 G/1
1 TABLET ORAL
Status: DISCONTINUED | OUTPATIENT
Start: 2019-11-14 | End: 2019-11-18 | Stop reason: HOSPADM

## 2019-11-14 RX ADMIN — PIPERACILLIN AND TAZOBACTAM 3.38 G: 3; .375 INJECTION, POWDER, LYOPHILIZED, FOR SOLUTION INTRAVENOUS at 21:46

## 2019-11-14 RX ADMIN — SODIUM CHLORIDE, PRESERVATIVE FREE 3 ML: 5 INJECTION INTRAVENOUS at 09:15

## 2019-11-14 RX ADMIN — DEXTROSE AND SODIUM CHLORIDE 125 ML/HR: 5; 450 INJECTION, SOLUTION INTRAVENOUS at 16:55

## 2019-11-14 RX ADMIN — TRAZODONE HYDROCHLORIDE 25 MG: 50 TABLET ORAL at 22:02

## 2019-11-14 RX ADMIN — SODIUM CHLORIDE, PRESERVATIVE FREE 3 ML: 5 INJECTION INTRAVENOUS at 21:48

## 2019-11-14 RX ADMIN — HYDROCODONE BITARTRATE AND ACETAMINOPHEN 1 TABLET: 10; 325 TABLET ORAL at 16:54

## 2019-11-14 RX ADMIN — FAMOTIDINE 20 MG: 20 TABLET ORAL at 21:46

## 2019-11-14 RX ADMIN — HYDROCODONE BITARTRATE AND ACETAMINOPHEN 1 TABLET: 5; 325 TABLET ORAL at 22:02

## 2019-11-14 RX ADMIN — HYDROMORPHONE HYDROCHLORIDE 1 MG: 2 INJECTION, SOLUTION INTRAMUSCULAR; INTRAVENOUS; SUBCUTANEOUS at 05:45

## 2019-11-14 RX ADMIN — PIPERACILLIN AND TAZOBACTAM 3.38 G: 3; .375 INJECTION, POWDER, LYOPHILIZED, FOR SOLUTION INTRAVENOUS at 11:53

## 2019-11-14 RX ADMIN — SUCRALFATE 1 G: 1 TABLET ORAL at 17:02

## 2019-11-14 RX ADMIN — GABAPENTIN 300 MG: 300 CAPSULE ORAL at 16:54

## 2019-11-14 RX ADMIN — BACLOFEN 10 MG: 10 TABLET ORAL at 09:14

## 2019-11-14 RX ADMIN — ONDANSETRON 4 MG: 2 INJECTION INTRAMUSCULAR; INTRAVENOUS at 17:02

## 2019-11-14 RX ADMIN — ENOXAPARIN SODIUM 40 MG: 40 INJECTION SUBCUTANEOUS at 16:54

## 2019-11-14 RX ADMIN — FAMOTIDINE 20 MG: 20 TABLET ORAL at 09:14

## 2019-11-14 RX ADMIN — ONDANSETRON 4 MG: 2 INJECTION INTRAMUSCULAR; INTRAVENOUS at 05:45

## 2019-11-14 RX ADMIN — GABAPENTIN 300 MG: 300 CAPSULE ORAL at 09:14

## 2019-11-14 RX ADMIN — GABAPENTIN 300 MG: 300 CAPSULE ORAL at 21:46

## 2019-11-14 RX ADMIN — SUCRALFATE 1 G: 1 TABLET ORAL at 11:53

## 2019-11-14 RX ADMIN — BACLOFEN 10 MG: 10 TABLET ORAL at 21:46

## 2019-11-14 NOTE — PLAN OF CARE
Problem: Patient Care Overview  Goal: Plan of Care Review  Outcome: Ongoing (interventions implemented as appropriate)   11/14/19 1542   Coping/Psychosocial   Plan of Care Reviewed With patient;spouse   Plan of Care Review   Progress no change   OTHER   Outcome Summary Pt is a 75 y/o M POD #2 diverting colostomy reversal secondary to perforated diverticulitis. Pt is a manual w/c user at baseline secondary to severe polymyalgia rheumatica and has been non-ambulatory for ~2 years, requires asssist for bathing/dressing and for lateral/slide board transfers to/from w/c (assist from wife). Pt presents with significant abdominal/incisional discomfort, acid reflux, pain, and nausea this date limiting mobility/activity tolerance and requires MOD assist for bed mobility and CGA for static sitting bout ~3-4 minutes EOB. Unable to tolerate transfers this date secondary to pain. Recommending IP rehab at d/c, as pt is not safe for home and wife unable to provide level of assist required at this time. Plan to see 3x/week at [facility] for progression of mobility.

## 2019-11-14 NOTE — PLAN OF CARE
Problem: Patient Care Overview  Goal: Plan of Care Review  Outcome: Ongoing (interventions implemented as appropriate)   11/14/19 2619   Coping/Psychosocial   Plan of Care Reviewed With patient   OTHER   Outcome Summary Pt is 74 year old male admitted for colostomy reversal. Pt uses wc at baseline with assist for transfers and ADLs. He is limited due to stiffness, weakness, pain, and nausea this date, though does tolerate sitting EOB for about 5 mins. At this point pt will require IP rehab at discharge before returning home with wife.

## 2019-11-14 NOTE — PROGRESS NOTES
General Surgery Progress Note     LOS: 2 days   Patient Care Team:  Tyron Ruffin MD as PCP - General (Family Medicine)    Subjective     No chief complaint on file.      Interval History:   Patient is still feeling quite tender but better than yesterday.  He is complaining of indigestion.  He has voided once since his Dugan catheter was removed.  He feels some gurgling in his stomach but has not passed gas yet.    Objective     Vital Signs  Temp:  [98.6 °F (37 °C)-99.6 °F (37.6 °C)] 98.6 °F (37 °C)  Heart Rate:  [75-79] 77  Resp:  [14-17] 17  BP: (113-137)/(69-76) 121/76    Physical Exam   Abdominal:   Soft, incision okay, RADHIKA serosanguineous       Review of Systems     Results Review:    Lab Results (last 24 hours)     Procedure Component Value Units Date/Time    CBC & Differential [[id number]] Collected:  11/14/19 0305    Specimen:  Blood Updated:  11/14/19 0421    Narrative:       The following orders were created for panel order CBC & Differential.  Procedure                               Abnormality         Status                     ---------                               -----------         ------                     CBC Auto Differential[[id number]]        Abnormal            Final result                 Please view results for these tests on the individual orders.    CBC Auto Differential [[id number]]  (Abnormal) Collected:  11/14/19 0305    Specimen:  Blood Updated:  11/14/19 0421     WBC 14.50 10*3/mm3      RBC 3.85 10*6/mm3      Hemoglobin 11.8 g/dL      Hematocrit 34.2 %      MCV 89.0 fL      MCH 30.6 pg      MCHC 34.4 g/dL      RDW 14.3 %      RDW-SD 45.1 fl      MPV 8.7 fL      Platelets 201 10*3/mm3      Neutrophil % 77.4 %      Lymphocyte % 10.4 %      Monocyte % 6.7 %      Eosinophil % 4.5 %      Basophil % 1.0 %      Neutrophils, Absolute 11.30 10*3/mm3      Lymphocytes, Absolute 1.50 10*3/mm3      Monocytes, Absolute 1.00 10*3/mm3      Eosinophils, Absolute 0.70 10*3/mm3      Basophils, Absolute  0.20 10*3/mm3      nRBC 0.1 /100 WBC     Narrative:       Appended report. These results have been appended to a previously verified report.    Scan Slide [[id number]] Collected:  11/14/19 0305    Specimen:  Blood Updated:  11/14/19 0421     RBC Morphology Normal     WBC Morphology Normal     Platelet Estimate Adequate        Imaging Results (Last 24 Hours)     ** No results found for the last 24 hours. **          I reviewed the patient's new clinical results.    Medication Review:    Current Facility-Administered Medications:   •  baclofen (LIORESAL) tablet 10 mg, 10 mg, Oral, BID, Carole Collins MD, 10 mg at 11/14/19 0914  •  bisacodyl (DULCOLAX) EC tablet 10 mg, 10 mg, Oral, Daily PRN, Carole Collins MD  •  dextrose 5 % and sodium chloride 0.45 % infusion, 125 mL/hr, Intravenous, Continuous, Carole Collins MD, Last Rate: 125 mL/hr at 11/13/19 2226, 125 mL/hr at 11/13/19 2226  •  docusate sodium (COLACE) capsule 100 mg, 100 mg, Oral, BID PRN, Carole Collins MD  •  enoxaparin (LOVENOX) syringe 40 mg, 40 mg, Subcutaneous, Daily, Carole Collins MD, 40 mg at 11/13/19 1529  •  famotidine (PEPCID) tablet 20 mg, 20 mg, Oral, BID, Carole Collins MD, 20 mg at 11/14/19 0914  •  gabapentin (NEURONTIN) capsule 300 mg, 300 mg, Oral, TID, Carole Collins MD, 300 mg at 11/14/19 0914  •  HYDROcodone-acetaminophen (NORCO)  MG per tablet 1 tablet, 1 tablet, Oral, Q4H PRN, Carole Collins MD, 1 tablet at 11/13/19 1807  •  HYDROcodone-acetaminophen (NORCO) 5-325 MG per tablet 1 tablet, 1 tablet, Oral, Q4H PRN, Carole Collins MD  •  HYDROcodone-acetaminophen (NORCO) 7.5-325 MG per tablet 1 tablet, 1 tablet, Oral, Q6H PRN, Carole Collins MD  •  HYDROmorphone (DILAUDID) injection 1 mg, 1 mg, Intravenous, Q2H PRN, 1 mg at 11/14/19 0545 **AND** naloxone (NARCAN) injection 0.1 mg, 0.1 mg, Intravenous, Q5 Min PRN, Carole Collins MD  •  magnesium hydroxide (MILK OF MAGNESIA) suspension 2400 mg/10mL 10 mL,  10 mL, Oral, Daily PRN, Carole Collins MD  •  Morphine sulfate (PF) injection 4 mg, 4 mg, Intravenous, Q2H PRN **AND** naloxone (NARCAN) injection 0.4 mg, 0.4 mg, Intravenous, Q5 Min PRN, Carole Collins MD  •  ondansetron (ZOFRAN) tablet 4 mg, 4 mg, Oral, Q6H PRN, 4 mg at 11/13/19 1807 **OR** ondansetron (ZOFRAN) injection 4 mg, 4 mg, Intravenous, Q6H PRN, Carole Collins MD, 4 mg at 11/14/19 0545  •  promethazine (PHENERGAN) 12.5 mg in sodium chloride 0.9 % 50 mL, 12.5 mg, Intravenous, Q6H PRN **OR** promethazine (PHENERGAN) injection 12.5 mg, 12.5 mg, Intramuscular, Q6H PRN, Carole Collins MD  •  sodium chloride 0.9 % flush 3 mL, 3 mL, Intravenous, Q12H, Carole Collins MD, 3 mL at 11/14/19 0915  •  sodium chloride 0.9 % flush 3-10 mL, 3-10 mL, Intravenous, PRN, Carole Collins MD  •  sucralfate (CARAFATE) tablet 1 g, 1 g, Oral, TID AC, Carole Collins MD  •  traZODone (DESYREL) tablet 25 mg, 25 mg, Oral, Nightly PRN, Carole Collins MD    Assessment/Plan     Active Problems:    * No active hospital problems. *      Status post colostomy reversal-doing well overall.  Will watch voiding carefully status post Dugan removal.  Continue antibiotics secondary to leukocytosis.  Will add Carafate for reflux and indigestion.  Await GI function.    Plan for disposition: Home when fully recovered    Carole Collins MD  11/14/19  11:08 AM

## 2019-11-14 NOTE — THERAPY EVALUATION
Acute Care - Occupational Therapy Initial Evaluation   [place]     Patient Name: Mamadou Romeo  : 1945  MRN: [id number]  Today's Date: 2019             Admit Date: 2019     No diagnosis found.  Patient Active Problem List   Diagnosis   (none) - all problems resolved or deleted     Past Medical History:   Diagnosis Date   • Arthritis    • Bowel trouble    • Cancer (CMS/HCC)     Skin   • GERD (gastroesophageal reflux disease)    • Insomnia    • Muscle spasms of lower extremity      Past Surgical History:   Procedure Laterality Date   • ABDOMINAL SURGERY     • BACK SURGERY     • CHOLECYSTECTOMY     • COLON SURGERY     • COLOSTOMY CLOSURE N/A 2019    Procedure: COLOSTOMY TAKEDOWN OR CLOSURE;  Surgeon: Carole Collins MD;  Location: [facility] OR;  Service: General   • SKIN BIOPSY            OT ASSESSMENT FLOWSHEET (last 12 hours)      Occupational Therapy Evaluation     Row Name 19 0930                   OT Evaluation Time/Intention    Subjective Information  complains of;nausea/vomiting indigestion  -SR        Document Type  evaluation  -SR        Comment  Pt lives at home with his wife.  He has been primarily using wc for the past year.  He completes lateral trasfers, sometimes with sliding board.  Reports he bathes one time per week with HH therapy.  Wife reports this is usually the only time he changes his clothes.  He has TTB for showers.    -SR           General Information    Prior Level of Function  w/c or scooter  -SR        Pertinent History of Current Functional Problem  Pt admitted for colostomy reversal.   -SR           Relationship/Environment    Lives With  spouse  -SR        Family Caregiver if Needed  spouse  -SR           Resource/Environmental Concerns    Current Living Arrangements  home/apartment/condo  -SR           Cognitive Assessment/Intervention- PT/OT    Orientation Status (Cognition)  oriented x 4  -SR           Bed Mobility Assessment/Treatment    Bed  Mobility Assessment/Treatment  supine-sit-supine  -SR        Supine-Sit-Supine [place] (Bed Mobility)  moderate assist (50% patient effort);2 person assist  -SR           BADL Safety/Performance    Impairments, BADL Safety/Performance  strength;pain  -SR           General ROM    GENERAL ROM COMMENTS  BUE slightly limited due to stiffness in shoulders.  Improved with movement.  Good distal UE ROM.     -SR           MMT (Manual Muscle Testing)    General MMT Comments  B shoulders 3/5, distal UE 4-/5  -SR           Positioning and Restraints    Pre-Treatment Position  in bed  -SR        Post Treatment Position  bed  -SR        In Bed  exit alarm on;call light within reach;with family/caregiver  -SR           Wound 11/12/19 1310 Bilateral abdomen Incision    Wound - Properties Group Date first assessed: 11/12/19  -PH Time first assessed: 1310  -PH Side: Bilateral  -PH Location: abdomen  -PH Primary Wound Type: Incision  -PH       Coping    Observed Emotional State  accepting;calm  -SR           Clinical Impression (OT)    Criteria for Skilled Therapeutic Interventions Met (OT Eval)  yes;treatment indicated  -SR        Rehab Potential (OT Eval)  good, to achieve stated therapy goals  -SR        Therapy Frequency (OT Eval)  3 times/wk  -SR        Predicted Duration of Therapy Intervention (Therapy Eval)  Until discharge   -SR        Anticipated Discharge Disposition (OT)  inpatient rehabilitation facility  -SR           OT Goals    Bed Mobility Goal Selection (OT)  bed mobility, OT goal 1  -SR        Transfer Goal Selection (OT)  transfer, OT goal 1  -SR        Toileting Goal Selection (OT)  toileting, OT goal 1  -SR           Bed Mobility Goal 1 (OT)    Activity/Assistive Device (Bed Mobility Goal 1, OT)  sit to supine/supine to sit  -SR        [place] Level/Cues Needed (Bed Mobility Goal 1, OT)  minimum assist (75% or more patient effort)  -SR        Time Frame (Bed Mobility Goal 1, OT)  2 weeks  -SR            Transfer Goal 1 (OT)    Activity/Assistive Device (Transfer Goal 1, OT)  bed-to-chair/chair-to-bed  -SR        [place] Level/Cues Needed (Transfer Goal 1, OT)  moderate assist (50-74% patient effort)  -SR        Time Frame (Transfer Goal 1, OT)  2 weeks  -SR           Toileting Goal 1 (OT)    Activity/Device (Toileting Goal 1, OT)  urinal  -SR        [place] Level/Cues Needed (Toileting Goal 1, OT)  conditional independence  -SR        Time Frame (Toileting Goal 1, OT)  2 weeks  -SR           Living Environment    Home Accessibility  wheelchair accessible  -SR          User Key  (r) = Recorded By, (t) = Taken By, (c) = Cosigned By    Initials Name Effective Dates    PH Arlin Lau RN 06/16/16 -     SR Nikki Snell, OT 03/01/19 -          Occupational Therapy Education     Title: PT OT SLP Therapies (In Progress)     Topic: Occupational Therapy (In Progress)     Point: Body mechanics (Done)     Description: Instruct learner(s) on proper positioning and spine alignment during self-care, functional mobility activities and/or exercises.    Learning Progress Summary           Patient Acceptance, E,TB, VU by  at 11/14/2019  2:00 PM                               User Key     Initials Effective Dates Name Provider Type Discipline    SR 03/01/19 -  Nikki Snell, OT Occupational Therapist OT                  OT Recommendation and Plan  Outcome Summary/Treatment Plan (OT)  Anticipated Discharge Disposition (OT): inpatient rehabilitation facility  Therapy Frequency (OT Eval): 3 times/wk  Plan of Care Review  Plan of Care Reviewed With: patient  Plan of Care Reviewed With: patient  Outcome Summary: Pt is 74 year old male admitted for colostomy reversal.  Pt uses wc at baseline with assist for transfers and ADLs.  He is limited due to stiffness, weakness, pain, and nausea this date, though does tolerate sitting EOB for about 5 mins.  At this point pt will require IP rehab at discharge  before returning home with wife.         Time Calculation:   Time Calculation- OT     Row Name 11/14/19 1400             Time Calculation- OT    OT Start Time  0930  -SR      OT Stop Time  0959  -SR      OT Time Calculation (min)  29 min  -SR      Total Timed Code Minutes- OT  19 minute(s)  -SR      OT Non-Billable Time (min)  10 min  -SR      OT Received On  11/14/19  -SR      OT - Next Appointment  11/18/19  -SR        User Key  (r) = Recorded By, (t) = Taken By, (c) = Cosigned By    Initials Name Provider Type    SR Nikki Snell, OT Occupational Therapist        Therapy Charges for Today     Code Description Service Date Service Provider Modifiers Qty    33476503332  OT EVAL MOD COMPLEXITY 4 11/14/2019 Nikki Snell OT GO 1    [id number]  OT THERAPEUTIC ACT EA 15 MIN 11/14/2019 Nikki Snell OT GO 1               Nikki Snell OT  11/14/2019

## 2019-11-14 NOTE — THERAPY EVALUATION
Patient Name: Mamadou Romeo  : 1945    MRN: [id number]                              Today's Date: 2019       Admit Date: 2019    Visit Dx: No diagnosis found.  Patient Active Problem List   Diagnosis   (none) - all problems resolved or deleted     Past Medical History:   Diagnosis Date   • Arthritis    • Bowel trouble    • Cancer (CMS/HCC)     Skin   • GERD (gastroesophageal reflux disease)    • Insomnia    • Muscle spasms of lower extremity      Past Surgical History:   Procedure Laterality Date   • ABDOMINAL SURGERY     • BACK SURGERY     • CHOLECYSTECTOMY     • COLON SURGERY     • COLOSTOMY CLOSURE N/A 2019    Procedure: COLOSTOMY TAKEDOWN OR CLOSURE;  Surgeon: Carole Collins MD;  Location: [facility] MAIN OR;  Service: General   • SKIN BIOPSY       General Information     Row Name 19 1517          PT Evaluation Time/Intention    Document Type  evaluation  -AO     Mode of Treatment  physical therapy  -AO     Row Name 19 1517          General Information    Patient Profile Reviewed?  yes  -AO     Prior Level of Function  -- MOD I with bed mobility, assist for w/c transfers, MOD I with household w/c propulsion/mobility, assist for bathing/dressing (HH aide 1x/week); denies falls  -AO     Existing Precautions/Restrictions  fall abdominal incision  -AO     Barriers to Rehab  none identified  -AO     Row Name 19 151          Relationship/Environment    Lives With  spouse Pt lives with spouse who provides  assist  -AO     Row Name 19 1517          Resource/Environmental Concerns    Current Living Arrangements  home/apartment/condo  -AO     Row Name 19 1517          Home Main Entrance    Number of Stairs, Main Entrance  none  -AO     Row Name 19 1517          Stairs Within Home, Primary    Number of Stairs, Within Home, Primary  none  -AO     Row Name 19 1517          Cognitive Assessment/Intervention- PT/OT    Orientation Status (Cognition)   "oriented x 4  -AO     Row Name 11/14/19 1517          Safety Issues, Functional Mobility    Impairments Affecting Function (Mobility)  balance;endurance/activity tolerance;pain  -AO       User Key  (r) = Recorded By, (t) = Taken By, (c) = Cosigned By    Initials Name Provider Type    Rayne Astorga, PT Physical Therapist        Mobility     Row Name 11/14/19 1517          Bed Mobility Assessment/Treatment    Bed Mobility Assessment/Treatment  supine-sit-supine  -AO     Supine-Sit-Supine [place] (Bed Mobility)  moderate assist (50% patient effort)  -AO     Assistive Device (Bed Mobility)  draw sheet;head of bed elevated;bed rails  -AO     Comment (Bed Mobility)  Pt able to advance BLEs to EOB with increased time to complete, required assist at trunk to avoid contraction/activation of abdominal musculature 2*/2 pain  -AO     Row Name 11/14/19 1517          Transfer Assessment/Treatment    Comment (Transfers)  Transfers not assessed this date secondary to pain, weakness, and inability to tolerate   -AO     Row Name 11/14/19 1517          Gait/Stairs Assessment/Training    Comment (Gait/Stairs)  Pt has been non-ambulatory for ~2 years secondary to severe joint pain related to polymyalgia rheumatica; uses slide board or lateral transfers to/from w/c but has not stood to transfer or ambulate in \"a very long time\"  -AO       User Key  (r) = Recorded By, (t) = Taken By, (c) = Cosigned By    Initials Name Provider Type    Rayne Astorga PT Physical Therapist        Obj/Interventions     Row Name 11/14/19 1517          General ROM    GENERAL ROM COMMENTS  BUE AROM elevation impaired 50-75%, BLE AROM impaired ~25%  -AO     Row Name 11/14/19 1517          MMT (Manual Muscle Testing)    General MMT Comments  BLEs: 2+/5  -AO     Row Name 11/14/19 1517          Static Sitting Balance    Level of [place] (Unsupported Sitting, Static Balance)  contact guard assist  -AO     Sitting Position (Unsupported Sitting, " Static Balance)  sitting on edge of bed  -AO     Time Able to Maintain Position (Unsupported Sitting, Static Balance)  3 to 4 minutes  -AO     Comment (Unsupported Sitting, Static Balance)  Pt completed static sitting bout EOB ~3-4 minutes with BUE support on bed surface with no LOB; forward head with cervicothoracic kyphosis  -AO     Row Name 11/14/19 1517          Sensory Assessment/Intervention    Sensory General Assessment  no sensation deficits identified Pt denies numbness/tingling  -AO       User Key  (r) = Recorded By, (t) = Taken By, (c) = Cosigned By    Initials Name Provider Type    Rayne Astorga, PT Physical Therapist        Goals/Plan     Row Name 11/14/19 1517          Bed Mobility Goal 1 (PT)    Activity/Assistive Device (Bed Mobility Goal 1, PT)  bed mobility activities, all  -AO     [place] Level/Cues Needed (Bed Mobility Goal 1, PT)  contact guard assist  -AO     Time Frame (Bed Mobility Goal 1, PT)  2 weeks  -AO     Progress/Outcomes (Bed Mobility Goal 1, PT)  goal not met  -AO     Row Name 11/14/19 1517          Transfer Goal 1 (PT)    Activity/Assistive Device (Transfer Goal 1, PT)  bed-to-chair/chair-to-bed;lateral transfer;wheelchair transfer  -AO     [place] Level/Cues Needed (Transfer Goal 1, PT)  moderate assist (50-74% patient effort)  -AO     Time Frame (Transfer Goal 1, PT)  2 weeks  -AO     Progress/Outcome (Transfer Goal 1, PT)  goal not met  -AO     Row Name 11/14/19 1517          Patient Education Goal (PT)    Activity (Patient Education Goal, PT)  Pt will complete w/c mobility 50ft with BUE propulsion requiring supervision to demonstrate increased independence with mobility and return to PLOF.  -AO       User Key  (r) = Recorded By, (t) = Taken By, (c) = Cosigned By    Initials Name Provider Type    Rayne Astorga, PT Physical Therapist        Clinical Impression     Row Name 11/14/19 1517          Pain Assessment    Additional Documentation  Pain Scale: Numbers  Pre/Post-Treatment (Group)  -AO     Row Name 11/14/19 1517          Pain Scale: Numbers Pre/Post-Treatment    Pre/Post Treatment Pain Comment  Pt initially denies pain and c/o nausea, however, then reports upper abdominal pain 2*/2 acid reflux (does not rate); increased incisional pain with mobility  -AO     Row Name 11/14/19 1517          Plan of Care Review    Plan of Care Reviewed With  patient;spouse  -AO     Row Name 11/14/19 1517          Physical Therapy Clinical Impression    Patient/Family Goals Statement (PT Clinical Impression)  Pt is a 75 y/o M POD #2 diverting colostomy reversal secondary to perforated diverticulitis. Pt is a manual w/c user at baseline secondary to severe polymyalgia rheumatica and has been non-ambulatory for ~2 years, requires asssist for bathing/dressing and for lateral/slide board transfers to/from w/c.  -AO     Criteria for Skilled Interventions Met (PT Clinical Impression)  yes;treatment indicated  -AO     Rehab Potential (PT Clinical Summary)  good, to achieve stated therapy goals  -AO     Row Name 11/14/19 1517          Vital Signs    Recovery Time  Vitals WNL throughout session  -AO     Row Name 11/14/19 1517          Positioning and Restraints    Pre-Treatment Position  in bed  -AO     Post Treatment Position  bed  -AO     In Bed  notified nsg;side lying left;call light within reach;encouraged to call for assist;exit alarm on;with family/caregiver  -AO       User Key  (r) = Recorded By, (t) = Taken By, (c) = Cosigned By    Initials Name Provider Type    Rayen Astorga, PT Physical Therapist        Outcome Measures    No documentation.       Physical Therapy Education     Title: PT OT SLP Therapies (In Progress)     Topic: Physical Therapy (Done)     Point: Mobility training (Done)     Learning Progress Summary           Patient Acceptance, E,TB, VU by AO at 11/14/2019  3:53 PM    Comment:  Educted pt on importance of mobility and upright positioning for pain relief,  symptoms of GERD/nausea, prevention of PNA and blood clots, and to maintain strength. Educated pt on safe bed mobility and static sitting balance EOB. Educated pt on POC.                               User Key     Initials Effective Dates Name Provider Type Discipline    AO 03/01/19 -  Rayne Gonzalez, PT Physical Therapist PT              PT Recommendation and Plan  Planned Therapy Interventions (PT Eval): balance training, bed mobility training, neuromuscular re-education, patient/family education, strengthening, transfer training, wheelchair management/propulsion training  Outcome Summary/Treatment Plan (PT)  Anticipated Discharge Disposition (PT): inpatient rehabilitation facility  Plan of Care Reviewed With: patient, spouse  Progress: no change  Outcome Summary: Pt is a 73 y/o M POD #2 diverting colostomy reversal secondary to perforated diverticulitis. Pt is a manual w/c user at baseline secondary to severe polymyalgia rheumatica and has been non-ambulatory for ~2 years, requires asssist for bathing/dressing and for lateral/slide board transfers to/from w/c (assist from wife). Pt presents with significant abdominal/incisional discomfort, acid reflux, pain, and nausea this date limiting mobility/activity tolerance and requires MOD assist for bed mobility and CGA for static sitting bout ~3-4 minutes EOB. Unable to tolerate transfers this date secondary to pain. Recommending IP rehab at d/c, as pt is not safe for home and wife unable to provide level of assist required at this time. Plan to see 3x/week at [facility] for progression of mobility.     Time Calculation:   PT Charges     Row Name 11/14/19 6062             Time Calculation    Start Time  1517  -AO      Stop Time  1535  -AO      Time Calculation (min)  18 min  -AO      PT Received On  11/14/19  -AO      PT - Next Appointment  11/15/19  -AO      PT Goal Re-Cert Due Date  11/28/19  -AO         Time Calculation- PT    Total Timed Code Minutes- PT  8 minute(s)   -AO      TCU Minutes- PT  23 min  -AO        User Key  (r) = Recorded By, (t) = Taken By, (c) = Cosigned By    Initials Name Provider Type    Rayne Astorga, PT Physical Therapist        Therapy Charges for Today     Code Description Service Date Service Provider Modifiers Qty    73555259714  PT EVAL MOD COMPLEXITY 4 11/14/2019 Rayne Gonzalez, PT GP 1    [id number]  PT THERAPEUTIC ACT EA 15 MIN 11/14/2019 Rayne Gonzalez, PT GP 1               Rayne Gonzalez, PT  11/14/2019

## 2019-11-14 NOTE — PROGRESS NOTES
Continued Stay Note  MARK Billy     Patient Name: Mamadou Romeo  MRN: [id number]  Today's Date: 11/14/2019    Admit Date: 11/12/2019    Discharge Plan     Row Name 11/14/19 1623       Plan    Plan  referral to [facility]: pending, see MSW for PASRR, no precert needed.  patient states is current with [facility].             Brandy Nevarez RN   [phone]

## 2019-11-15 ENCOUNTER — APPOINTMENT (OUTPATIENT)
Dept: GENERAL RADIOLOGY | Facility: HOSPITAL | Age: 74
End: 2019-11-15

## 2019-11-15 LAB
BASOPHILS # BLD AUTO: 0.1 10*3/MM3 (ref 0–0.2)
BASOPHILS NFR BLD AUTO: 0.5 % (ref 0–1.5)
DEPRECATED RDW RBC AUTO: 43.8 FL (ref 37–54)
EOSINOPHIL # BLD AUTO: 0.5 10*3/MM3 (ref 0–0.4)
EOSINOPHIL NFR BLD AUTO: 5.3 % (ref 0.3–6.2)
ERYTHROCYTE [DISTWIDTH] IN BLOOD BY AUTOMATED COUNT: 14.1 % (ref 12.3–15.4)
HCT VFR BLD AUTO: 30.3 % (ref 37.5–51)
HGB BLD-MCNC: 10.8 G/DL (ref 13–17.7)
LYMPHOCYTES # BLD AUTO: 0.8 10*3/MM3 (ref 0.7–3.1)
LYMPHOCYTES NFR BLD AUTO: 8.1 % (ref 19.6–45.3)
MCH RBC QN AUTO: 31.3 PG (ref 26.6–33)
MCHC RBC AUTO-ENTMCNC: 35.7 G/DL (ref 31.5–35.7)
MCV RBC AUTO: 87.6 FL (ref 79–97)
MONOCYTES # BLD AUTO: 0.7 10*3/MM3 (ref 0.1–0.9)
MONOCYTES NFR BLD AUTO: 7.3 % (ref 5–12)
NEUTROPHILS # BLD AUTO: 7.7 10*3/MM3 (ref 1.7–7)
NEUTROPHILS NFR BLD AUTO: 78.8 % (ref 42.7–76)
NRBC BLD AUTO-RTO: 0.2 /100 WBC (ref 0–0.2)
PLATELET # BLD AUTO: 167 10*3/MM3 (ref 140–450)
PMV BLD AUTO: 7.9 FL (ref 6–12)
RBC # BLD AUTO: 3.46 10*6/MM3 (ref 4.14–5.8)
WBC NRBC COR # BLD: 9.8 10*3/MM3 (ref 3.4–10.8)

## 2019-11-15 PROCEDURE — 25010000002 PIPERACILLIN SOD-TAZOBACTAM PER 1 G: Performed by: SURGERY

## 2019-11-15 PROCEDURE — 71045 X-RAY EXAM CHEST 1 VIEW: CPT

## 2019-11-15 PROCEDURE — 99024 POSTOP FOLLOW-UP VISIT: CPT | Performed by: SURGERY

## 2019-11-15 PROCEDURE — 97530 THERAPEUTIC ACTIVITIES: CPT

## 2019-11-15 PROCEDURE — 85025 COMPLETE CBC W/AUTO DIFF WBC: CPT | Performed by: SURGERY

## 2019-11-15 PROCEDURE — 97112 NEUROMUSCULAR REEDUCATION: CPT

## 2019-11-15 PROCEDURE — 25010000002 ENOXAPARIN PER 10 MG: Performed by: SURGERY

## 2019-11-15 RX ADMIN — GABAPENTIN 300 MG: 300 CAPSULE ORAL at 08:36

## 2019-11-15 RX ADMIN — SODIUM CHLORIDE, PRESERVATIVE FREE 3 ML: 5 INJECTION INTRAVENOUS at 08:35

## 2019-11-15 RX ADMIN — SUCRALFATE 1 G: 1 TABLET ORAL at 18:11

## 2019-11-15 RX ADMIN — BACLOFEN 10 MG: 10 TABLET ORAL at 08:36

## 2019-11-15 RX ADMIN — SODIUM CHLORIDE, PRESERVATIVE FREE 3 ML: 5 INJECTION INTRAVENOUS at 21:29

## 2019-11-15 RX ADMIN — PIPERACILLIN AND TAZOBACTAM 3.38 G: 3; .375 INJECTION, POWDER, LYOPHILIZED, FOR SOLUTION INTRAVENOUS at 12:15

## 2019-11-15 RX ADMIN — FAMOTIDINE 20 MG: 20 TABLET ORAL at 08:36

## 2019-11-15 RX ADMIN — ENOXAPARIN SODIUM 40 MG: 40 INJECTION SUBCUTANEOUS at 18:11

## 2019-11-15 RX ADMIN — SUCRALFATE 1 G: 1 TABLET ORAL at 12:15

## 2019-11-15 RX ADMIN — BACLOFEN 10 MG: 10 TABLET ORAL at 21:29

## 2019-11-15 RX ADMIN — PIPERACILLIN AND TAZOBACTAM 3.38 G: 3; .375 INJECTION, POWDER, LYOPHILIZED, FOR SOLUTION INTRAVENOUS at 05:04

## 2019-11-15 RX ADMIN — SUCRALFATE 1 G: 1 TABLET ORAL at 08:35

## 2019-11-15 RX ADMIN — DEXTROSE AND SODIUM CHLORIDE 125 ML/HR: 5; 450 INJECTION, SOLUTION INTRAVENOUS at 05:03

## 2019-11-15 RX ADMIN — GABAPENTIN 300 MG: 300 CAPSULE ORAL at 18:11

## 2019-11-15 RX ADMIN — PIPERACILLIN AND TAZOBACTAM 3.38 G: 3; .375 INJECTION, POWDER, LYOPHILIZED, FOR SOLUTION INTRAVENOUS at 21:27

## 2019-11-15 RX ADMIN — FAMOTIDINE 20 MG: 20 TABLET ORAL at 21:29

## 2019-11-15 RX ADMIN — GABAPENTIN 300 MG: 300 CAPSULE ORAL at 21:29

## 2019-11-15 NOTE — PLAN OF CARE
Prepped: groin. Prepped with: ChloraPrep. The site was clipped. The patient was draped.  Problem: Patient Care Overview  Goal: Plan of Care Review  Outcome: Ongoing (interventions implemented as appropriate)

## 2019-11-15 NOTE — PROGRESS NOTES
General Surgery Progress Note     LOS: 3 days   Patient Care Team:  Tyron Ruffin MD as PCP - General (Family Medicine)    Subjective     No chief complaint on file.      Interval History:   Patient still has some discomfort.  His reflux is improved.  He still is not passing much gas.  The weakness in his legs he has gotten out of bed with PT is working with him.    Objective     Vital Signs  Temp:  [98.5 °F (36.9 °C)-99.8 °F (37.7 °C)] 98.5 °F (36.9 °C)  Heart Rate:  [75-81] 75  Resp:  [12-16] 12  BP: (116-127)/(72-79) 116/74    Physical Exam   Abdominal:   Well-healing incision.  [place] intact.  RADHIKA serosanguineous       Review of Systems     Results Review:    Lab Results (last 24 hours)     Procedure Component Value Units Date/Time    CBC & Differential [[id number]] Collected:  11/15/19 0330    Specimen:  Blood Updated:  11/15/19 0403    Narrative:       The following orders were created for panel order CBC & Differential.  Procedure                               Abnormality         Status                     ---------                               -----------         ------                     CBC Auto Differential[[id number]]        Abnormal            Final result                 Please view results for these tests on the individual orders.    CBC Auto Differential [[id number]]  (Abnormal) Collected:  11/15/19 0330    Specimen:  Blood Updated:  11/15/19 0403     WBC 9.80 10*3/mm3      RBC 3.46 10*6/mm3      Hemoglobin 10.8 g/dL      Hematocrit 30.3 %      MCV 87.6 fL      MCH 31.3 pg      MCHC 35.7 g/dL      RDW 14.1 %      RDW-SD 43.8 fl      MPV 7.9 fL      Platelets 167 10*3/mm3      Neutrophil % 78.8 %      Lymphocyte % 8.1 %      Monocyte % 7.3 %      Eosinophil % 5.3 %      Basophil % 0.5 %      Neutrophils, Absolute 7.70 10*3/mm3      Lymphocytes, Absolute 0.80 10*3/mm3      Monocytes, Absolute 0.70 10*3/mm3      Eosinophils, Absolute 0.50 10*3/mm3      Basophils, Absolute 0.10 10*3/mm3      nRBC 0.2  /100 WBC         Imaging Results (Last 24 Hours)     ** No results found for the last 24 hours. **          I reviewed the patient's new clinical results.    Medication Review:    Current Facility-Administered Medications:   •  baclofen (LIORESAL) tablet 10 mg, 10 mg, Oral, BID, Carole Collins MD, 10 mg at 11/15/19 0836  •  bisacodyl (DULCOLAX) EC tablet 10 mg, 10 mg, Oral, Daily PRN, Carole Collins MD  •  dextrose 5 % and sodium chloride 0.45 % infusion, 125 mL/hr, Intravenous, Continuous, Carole Collins MD, Last Rate: 125 mL/hr at 11/15/19 0503, 125 mL/hr at 11/15/19 0503  •  docusate sodium (COLACE) capsule 100 mg, 100 mg, Oral, BID PRN, Carole Collins MD  •  enoxaparin (LOVENOX) syringe 40 mg, 40 mg, Subcutaneous, Daily, Carole Collins MD, 40 mg at 11/14/19 1654  •  famotidine (PEPCID) tablet 20 mg, 20 mg, Oral, BID, Carole Collins MD, 20 mg at 11/15/19 0836  •  gabapentin (NEURONTIN) capsule 300 mg, 300 mg, Oral, TID, Carole Collins MD, 300 mg at 11/15/19 0836  •  HYDROcodone-acetaminophen (NORCO)  MG per tablet 1 tablet, 1 tablet, Oral, Q4H PRN, Carole Collins MD, 1 tablet at 11/14/19 1654  •  HYDROcodone-acetaminophen (NORCO) 5-325 MG per tablet 1 tablet, 1 tablet, Oral, Q4H PRN, Carole Collins MD, 1 tablet at 11/14/19 2202  •  HYDROcodone-acetaminophen (NORCO) 7.5-325 MG per tablet 1 tablet, 1 tablet, Oral, Q6H PRN, Carole Collins MD  •  HYDROmorphone (DILAUDID) injection 1 mg, 1 mg, Intravenous, Q2H PRN, 1 mg at 11/14/19 0545 **AND** naloxone (NARCAN) injection 0.1 mg, 0.1 mg, Intravenous, Q5 Min PRN, Carole Collins MD  •  magnesium hydroxide (MILK OF MAGNESIA) suspension 2400 mg/10mL 10 mL, 10 mL, Oral, Daily PRN, Carole Collins MD  •  Morphine sulfate (PF) injection 4 mg, 4 mg, Intravenous, Q2H PRN **AND** naloxone (NARCAN) injection 0.4 mg, 0.4 mg, Intravenous, Q5 Min PRN, Carole Collins MD  •  ondansetron (ZOFRAN) tablet 4 mg, 4 mg, Oral, Q6H PRN, 4 mg at  11/13/19 1807 **OR** ondansetron (ZOFRAN) injection 4 mg, 4 mg, Intravenous, Q6H PRN, Carole Collins MD, 4 mg at 11/14/19 1702  •  piperacillin-tazobactam (ZOSYN) IVPB 3.375 g in 100 mL NS (CD), 3.375 g, Intravenous, Q8H, Carole Collins MD, 3.375 g at 11/15/19 0504  •  promethazine (PHENERGAN) 12.5 mg in sodium chloride 0.9 % 50 mL, 12.5 mg, Intravenous, Q6H PRN **OR** promethazine (PHENERGAN) injection 12.5 mg, 12.5 mg, Intramuscular, Q6H PRN, Carole Collins MD  •  sodium chloride 0.9 % flush 3 mL, 3 mL, Intravenous, Q12H, Carole Collins MD, 3 mL at 11/15/19 0835  •  sodium chloride 0.9 % flush 3-10 mL, 3-10 mL, Intravenous, PRN, Carole Collins MD  •  sucralfate (CARAFATE) tablet 1 g, 1 g, Oral, TID AC, Carole Collins MD, 1 g at 11/15/19 0835  •  traZODone (DESYREL) tablet 25 mg, 25 mg, Oral, Nightly PRN, Carole Collins MD, 25 mg at 11/14/19 2202    Assessment/Plan     Active Problems:    * No active hospital problems. *      Status post colostomy takedown-patient progressing as expected.  Continue antibiotics another day or so.  Continue clear liquids until GI function returns.  Dr. Singleton covering the weekend.    Plan for disposition: When fully recovered    Carole Collins MD  11/15/19  11:37 AM

## 2019-11-15 NOTE — THERAPY TREATMENT NOTE
Patient Name: Mamadou Romeo  : 1945    MRN: [id number]                              Today's Date: 11/15/2019       Admit Date: 2019    Visit Dx: No diagnosis found.  Patient Active Problem List   Diagnosis   (none) - all problems resolved or deleted     Past Medical History:   Diagnosis Date   • Arthritis    • Bowel trouble    • Cancer (CMS/HCC)     Skin   • GERD (gastroesophageal reflux disease)    • Insomnia    • Muscle spasms of lower extremity      Past Surgical History:   Procedure Laterality Date   • ABDOMINAL SURGERY     • BACK SURGERY     • CHOLECYSTECTOMY     • COLON SURGERY     • COLOSTOMY CLOSURE N/A 2019    Procedure: COLOSTOMY TAKEDOWN OR CLOSURE;  Surgeon: Carole Collins MD;  Location: [facility] MAIN OR;  Service: General   • SKIN BIOPSY       General Information     Row Name 11/15/19 1341          PT Evaluation Time/Intention    Document Type  therapy note (daily note)  -SC     Mode of Treatment  physical therapy  -SC     Row Name 11/15/19 1341          General Information    Existing Precautions/Restrictions  fall abdominal incision  -SC     Row Name 11/15/19 1341          Cognitive Assessment/Intervention- PT/OT    Orientation Status (Cognition)  oriented x 4  -SC     Row Name 11/15/19 1341          Safety Issues, Functional Mobility    Impairments Affecting Function (Mobility)  balance;endurance/activity tolerance;pain  -SC       User Key  (r) = Recorded By, (t) = Taken By, (c) = Cosigned By    Initials Name Provider Type    SC Riya Escalona PTA Physical Therapy Assistant        Mobility     Row Name 11/15/19 1341          Bed Mobility Assessment/Treatment    Bed Mobility Assessment/Treatment  sit-supine  -SC     Sit-Supine [place] (Bed Mobility)  minimum assist (75% patient effort);2 person assist  -SC     Assistive Device (Bed Mobility)  bed rails;head of bed elevated  -SC     Row Name 11/15/19 1341          Transfer Assessment/Treatment    Comment (Transfers)   bed to wheel chair with use of sliding board  -[facility] Name 11/15/19 1341          Bed-Chair Transfer    Bed-Chair [place] (Transfers)  minimum assist (75% patient effort);2 person assist  -SC     Assistive Device (Bed-Chair Transfers)  sliding board  -[facility] Name 11/15/19 1341          Gait/Stairs Assessment/Training    Comment (Gait/Stairs)  pt has been unable to weight bear for about 2 years  -SC       User Key  (r) = Recorded By, (t) = Taken By, (c) = Cosigned By    Initials Name Provider Type    Riya Mosquera, MITCH Physical Therapy Assistant        Obj/Interventions     [facility] Name 11/15/19 1343          Static Sitting Balance    Level of [place] (Unsupported Sitting, Static Balance)  contact guard assist  -SC     Sitting Position (Unsupported Sitting, Static Balance)  sitting on edge of bed  -SC     Time Able to Maintain Position (Unsupported Sitting, Static Balance)  4 to 5 minutes  -[facility] Name 11/15/19 1343          Static Standing Balance    Level of [place] (Supported Standing, Static Balance)  minimal assist, 75% patient effort  -SC     Time Able to Maintain Position (Supported Standing, Static Balance)  45 to 60 seconds  -SC       User Key  (r) = Recorded By, (t) = Taken By, (c) = Cosigned By    Initials Name Provider Type    Riya Mosquera, MITCH Physical Therapy Assistant        Goals/Plan    No documentation.       Clinical Impression     [facility] Name 11/15/19 0043          Pain Assessment    Additional Documentation  Pain Scale: FACES Pre/Post-Treatment (Group)  -SC     Row Name 11/15/19 8513          Pain Scale: Numbers Pre/Post-Treatment    Pain Location  abdomen  -SC     Pre/Post Treatment Pain Comment  pt reported some chest pain during tx session but reported this may be associated to acid reflux  -SC     Pain Intervention(s)  Rest  -[facility] Name 11/15/19 1341          Pain Scale: FACES Pre/Post-Treatment    Pain: FACES Scale, Pretreatment  2-->hurts little bit   -SC     Pain: FACES Scale, Post-Treatment  4-->hurts little more  -SC     Row Name 11/15/19 1344          Positioning and Restraints    Pre-Treatment Position  in bed  -SC     Post Treatment Position  chair  -SC     In Chair  notified nsg;sitting;call light within reach;with family/caregiver  -SC       User Key  (r) = Recorded By, (t) = Taken By, (c) = Cosigned By    Initials Name Provider Type    Riya Mosquera PTA Physical Therapy Assistant        Outcome Measures     Row Name 11/15/19 1346          How much help from another person do you currently need...    Turning from your back to your side while in flat bed without using bedrails?  2  -SC     Moving from lying on back to sitting on the side of a flat bed without bedrails?  2  -SC     Moving to and from a bed to a chair (including a wheelchair)?  2  -SC     Standing up from a chair using your arms (e.g., wheelchair, bedside chair)?  1  -SC     Climbing 3-5 steps with a railing?  1  -SC     To walk in hospital room?  1  -SC     AM-PAC 6 Clicks Score (PT)  9  -SC     Row Name 11/15/19 1346          Functional Assessment    Outcome Measure Options  AM-PAC 6 Clicks Basic Mobility (PT)  -SC       User Key  (r) = Recorded By, (t) = Taken By, (c) = Cosigned By    Initials Name Provider Type    Ryia Mosquera PTA Physical Therapy Assistant        Physical Therapy Education     Title: PT OT SLP Therapies (In Progress)     Topic: Physical Therapy (Done)     Point: Mobility training (Done)     Learning Progress Summary           Patient AcceptanceYOLANDA VU by SC at 11/15/2019  1:48 PM    AcceptanceYOLANDA TB, VU by ARPIT at 11/14/2019  3:53 PM    Comment:  Educted pt on importance of mobility and upright positioning for pain relief, symptoms of GERD/nausea, prevention of PNA and blood clots, and to maintain strength. Educated pt on safe bed mobility and static sitting balance EOB. Educated pt on POC.   Family YOLANDA Valle VU by SC at 11/15/2019  1:48 PM                                User Key     Initials Effective Dates Name Provider Type Discipline    AO 03/01/19 -  Rayne Gonzalez, PT Physical Therapist PT    SC 03/01/19 -  Riya Escalona PTA Physical Therapy Assistant PT              PT Recommendation and Plan     Outcome Summary/Treatment Plan (PT)  Anticipated Discharge Disposition (PT): inpatient rehabilitation facility  Plan of Care Reviewed With: patient, spouse  Progress: improving  Outcome Summary: pt amy tx session well today.  pt was able to use sliding board transfer to the chair but with assist of 2.  pt is still far from his PLOF but has good potential to cont to improve with physical therapy.  recommend IP rehab at d/c to allow pt to recover his mobility     Time Calculation:   PT Charges     Row Name 11/15/19 1350             Time Calculation    Start Time  1135  -SC      Stop Time  1155  -SC      Time Calculation (min)  20 min  -SC      PT Received On  11/15/19  -SC      PT - Next Appointment  11/16/19  -SC         Time Calculation- PT    Total Timed Code Minutes- PT  20 minute(s)  -SC         Timed Charges    30042 -  PT Neuromuscular Reeducation Minutes  10  -SC      42270 - PT Therapeutic Activity Minutes  10  -SC        User Key  (r) = Recorded By, (t) = Taken By, (c) = Cosigned By    Initials Name Provider Type    SC Riya Escalona PTA Physical Therapy Assistant        Therapy Charges for Today     Code Description Service Date Service Provider Modifiers Qty    28107712180 HC PT NEUROMUSC RE EDUCATION EA 15 MIN 11/15/2019 Riya Escalona PTA GP 1    22841367843 HC PT THERAPEUTIC ACT EA 15 MIN 11/15/2019 Riya Escalona PTA GP 1          PT G-Codes  Outcome Measure Options: AM-PAC 6 Clicks Basic Mobility (PT)  AM-PAC 6 Clicks Score (PT): 9    Riya Escalona PTA  11/15/2019

## 2019-11-15 NOTE — PROGRESS NOTES
Malnutrition Severity Assessment    Patient Name:  Mamadou Romeo  YOB: 1945  MRN: [id number]  Admit Date:  11/12/2019    Patient meets criteria for : Severe Malnutrition    Comments:  Intervention Boost breeze QD provides 250 kcal and 14 gm Protein if consumed.     Severe chronic malnutrition RT physiological causes (perforated sigmoid colon) AEB moderate fat loss, severe muscle wasting, and < 75% of est energy requirement for > 1 month.     Malnutrition Severity Assessment  Malnutrition Type: Chronic Disease - Related Malnutrition     Malnutrition Type (last 8 hours)      Malnutrition Severity Assessment     Row Name 11/15/19 1124       Malnutrition Severity Assessment    Malnutrition Type  Chronic Disease - Related Malnutrition    Row Name 11/15/19 1132       Insufficient Energy Intake     Insufficient Energy Intake   <75% of est. energy requirement for > or equal to 1 month    Row Name 11/15/19 1124       Unintentional Weight Loss     Unintentional Weight Loss   -- pt reported that wt was 180 lb 1.5 years ago and is now 130 lb. Weight loss of 27%.     Row Name 11/15/19 1124       Muscle Loss    [place] Region  Moderate - slight depression    Clavicle Bone Region  Severe - protruding prominent bone    Acromion Bone Region  None    Scapular Bone Region  -- unable to obtain d/t positioning    Dorsal Hand Region  Moderate - slight depression    Patellar Region  Severe - prominent bone, square looking, very little muscle definition    Anterior Thigh Region  Severe - line/depression along thigh, obviously thin    Posterior Calf Region  Severe - thin with very little definition/firmness    Row Name 11/15/19 1124       Fat Loss    Orbital Region   Moderate -  somewhat hollowness, slightly dark circles    Upper Arm Region  Moderate - some fat tissue, not ample    Thoracic & Lumbar Region  Moderate - ribs visible with mild depressions, iliac crest somewhat prominent    Row Name 11/15/19 1132       Criteria  Met (Must meet criteria for severity in at least 2 of these categories: M Wasting, Fat Loss, Fluid, Secondary Signs, Wt. Status, Intake)    Patient meets criteria for   Severe Malnutrition    Row Name 11/15/19 1124       Criteria Met (Must meet criteria for severity in at least 2 of these categories: M Wasting, Fat Loss, Fluid, Secondary Signs, Wt. Status, Intake)    Patient meets criteria for   --          Electronically signed by:  Adrienne Andrade RD  11/15/19 11:38 AM

## 2019-11-15 NOTE — CONSULTS
"Adult Nutrition  Assessment/PES    Patient Name:  Mamadou Romeo  YOB: 1945  MRN: [id number]  Admit Date:  11/12/2019    Assessment Date:  11/15/2019    Comments:  Agreed to Boost Breeze QD providing 250 kcal and 14 gm Protein if consumed.     Reason for Assessment     Row Name 11/15/19 1103          Reason for Assessment    Reason For Assessment  -- MST 3/CLLIQ x 4 days      Diagnosis  -- PMH: Arithritis, Cancer     Identified At Risk by Screening Criteria  -- Current dx: Colostomy reversal 11/12/19, Reflux          Nutrition/Diet History     Row Name 11/15/19 1105          Nutrition/Diet History    Typical Food/Fluid Intake  D/W pt what meal intake was like at home prior to reversal-Bk: oatmeal, 1/2 poptart or toast, Lunch: 1/2 to 1/4 sandwich with soup, Dinner: 1/2 spaghetti (frozen meal). Confirmed UBW but also has lost 50# within the last 1.5 years. Agreed to boost breeze QD. Pt stated that when he drinks anything his chest hurt-Carafate ordered today and nrg Beba gave this morning.      Food Allergies  -- Shellfish         Anthropometrics     Row Name 11/15/19 1117          Anthropometrics    Height  167 cm (65.75\")     Weight  59 kg (130 lb 1.1 oz) 11/12/19        Admit Weight    Admit Weight  59 kg (130 lb) 11/12/19        Ideal Body Weight (IBW)    Ideal Body Weight (IBW) (kg)  64.6     % Ideal Body Weight  91.33        Usual Body Weight (UBW)    Usual Body Weight  59 kg (130 lb)     % Usual Body Weight  100.05     Weight Loss Time Frame  Wt hx   135 lb (10/19),  145 lb (9/19)        Body Mass Index (BMI)    BMI (kg/m2)  21.2         Labs/Tests/Procedures/Meds     Row Name 11/15/19 1121          Labs/Procedures/Meds    Lab Results Comments  H/H 10.8/30.3 (L)        Medications    Pertinent Medications Comments  Pepcid, Zosyn, Carafate         Physical Findings     Row Name 11/15/19 1121          Physical Findings    Overall Physical Appearance  -- Appears frail; NFPE completed 11/15/19; " "See MSA     Gastrointestinal  -- No BM x 3 days- colostomy reversal awaiting bowel function     Oral/Mouth Cavity  -- No swallowing or chewing problesms. Having reflux badly.      Skin  -- Abdominal Incision         Estimated/Assessed Needs     Row Name 11/15/19 1123 11/15/19 1117       Calculation Measurements    Height  --  167 cm (65.75\")       Estimated/Assessed Needs    Additional Documentation  Calorie Requirements (Group);Fluid Requirements (Group);Protein Requirements (Group)  --       Calorie Requirements    Weight Used For Calorie Calculations  -- -  --    Estimated Calorie Need Method  -- -  --    Estimated Calorie Requirement Comment  -- -  --       Protein Requirements    Weight Used For Protein Calculations  -- -  --    Est Protein Requirement Amount (gms/kg)  -- -  --       Fluid Requirements    Estimated Fluid Requirements (mL/day)  -- -  --        Nutrition Prescription Ordered     Row Name 11/15/19 1123          Nutrition Prescription PO    Current PO Diet  Clear Liquid        Nutrition Prescription EN    Enteral Route  -- -     Product  -- -     Modulars  -- -     TF Delivery Method  -- -     Water flush (mL)   -- -     Water Flush Frequency  -- -         Evaluation of Received Nutrient/Fluid Intake     Row Name 11/15/19 1124          PO Evaluation    % PO Intake  observed 50% of CLLIQ consumed        EN Evaluation    Number of Days EN Intake Evaluated  -- -     TF Changes  -- -     TF Residual  -- -     TF Tolerance  -- -           Malnutrition Severity Assessment     Row Name 11/15/19 1132 11/15/19 1124       Malnutrition Severity Assessment    Malnutrition Type  --  Chronic Disease - Related Malnutrition       Insufficient Energy Intake     Insufficient Energy Intake   <75% of est. energy requirement for > or equal to 1 month  --       Unintentional Weight Loss     Unintentional Weight Loss   --  -- pt reported that wt was 180 lb 1.5 years ago and is now 130 lb. Weight loss of 27%.        " Muscle Loss    [place] Region  --  Moderate - slight depression    Clavicle Bone Region  --  Severe - protruding prominent bone    Acromion Bone Region  --  None    Scapular Bone Region  --  -- unable to obtain d/t positioning    Dorsal Hand Region  --  Moderate - slight depression    Patellar Region  --  Severe - prominent bone, square looking, very little muscle definition    Anterior Thigh Region  --  Severe - line/depression along thigh, obviously thin    Posterior Calf Region  --  Severe - thin with very little definition/firmness       Fat Loss    Orbital Region   --  Moderate -  somewhat hollowness, slightly dark circles    Upper Arm Region  --  Moderate - some fat tissue, not ample    Thoracic & Lumbar Region  --  Moderate - ribs visible with mild depressions, iliac crest somewhat prominent       Criteria Met (Must meet criteria for severity in at least 2 of these categories: M Wasting, Fat Loss, Fluid, Secondary Signs, Wt. Status, Intake)    Patient meets criteria for   Severe Malnutrition           Problem/Interventions:  Problem 1     Row Name 11/15/19 1127          Nutrition Diagnoses Problem 1    Problem 1  -- Severe chronic malnutrition      Etiology (related to)  -- physiological causes (perforated sigmoid colon)     Signs/Symptoms (evidenced by)  -- moderate fat loss, severe muscle wasting, < 75% of est energy requirement for > 1 month.               Intervention Goal     Row Name 11/15/19 1132          Intervention Goal    General  -- Diet advances; > 65% of meals, consumes 1 ONS         Nutrition Intervention     Row Name 11/15/19 1132          Nutrition Intervention    RD/Tech Action  Recommend/ordered     Recommended/Ordered  Supplement         Nutrition Prescription     Row Name 11/15/19 1132          Nutrition Prescription PO    PO Prescription  Begin/change supplement     Supplement  Boost Breeze     Supplement Frequency  Daily         Education/Evaluation     Row Name 11/15/19 1138           Monitor/Evaluation    Monitor  Pertinent labs;Supplement intake;Weight;Skin status;PO intake BM; Diet advancement           Electronically signed by:  Adrienne Andrade RD  11/15/19 11:33 AM

## 2019-11-15 NOTE — PLAN OF CARE
Problem: Patient Care Overview  Goal: Plan of Care Review  Outcome: Ongoing (interventions implemented as appropriate)   11/15/19 6693   Coping/Psychosocial   Plan of Care Reviewed With patient;spouse   Plan of Care Review   Progress improving   OTHER   Outcome Summary pt amy tx session well today. pt was able to use sliding board transfer to the chair but with assist of 2. pt is still far from his PLOF but has good potential to cont to improve with physical therapy. recommend IP rehab at d/c to allow pt to recover his mobility

## 2019-11-15 NOTE — PROGRESS NOTES
Continued Stay Note  MARK Billy     Patient Name: Mamadou Romeo  MRN: [id number]  Today's Date: 11/15/2019    Admit Date: 11/12/2019    Discharge Plan     Row Name 11/15/19 1712       Plan    Plan  DC Plan: [facility] accepted ([address] [place] [address] #[phone]). PASRR approved. No precert required.     Patient/Family in Agreement with Plan  yes    Row Name 11/15/19 1620                         Discharge Codes    No documentation.       KAMERON Chung    Phone # [phone]  Cell #[phone]  Fax#[phone]  [email or web address]        KAMERON Chung

## 2019-11-15 NOTE — PROGRESS NOTES
Continued Stay Note  MARK Billy     Patient Name: Mamadou Romeo  MRN: [id number]  Today's Date: 11/15/2019    Admit Date: 11/12/2019    Discharge Plan     Row Name 11/15/19 3840       Plan    Plan  referral to [facility]: pending. no precert needed.  see MSW for JESUS, second choice [facility], third choice [facility]    Plan Comments  [facility], spoke to patient and was given rehab choices, notifed JAYLYN Mathews RN

## 2019-11-16 PROCEDURE — 97530 THERAPEUTIC ACTIVITIES: CPT

## 2019-11-16 PROCEDURE — 25010000002 ENOXAPARIN PER 10 MG: Performed by: SURGERY

## 2019-11-16 PROCEDURE — 25010000002 PIPERACILLIN SOD-TAZOBACTAM PER 1 G: Performed by: SURGERY

## 2019-11-16 PROCEDURE — 99024 POSTOP FOLLOW-UP VISIT: CPT | Performed by: SURGERY

## 2019-11-16 PROCEDURE — 97112 NEUROMUSCULAR REEDUCATION: CPT

## 2019-11-16 RX ADMIN — SUCRALFATE 1 G: 1 TABLET ORAL at 17:32

## 2019-11-16 RX ADMIN — PIPERACILLIN AND TAZOBACTAM 3.38 G: 3; .375 INJECTION, POWDER, LYOPHILIZED, FOR SOLUTION INTRAVENOUS at 13:00

## 2019-11-16 RX ADMIN — SODIUM CHLORIDE, PRESERVATIVE FREE 3 ML: 5 INJECTION INTRAVENOUS at 09:58

## 2019-11-16 RX ADMIN — GABAPENTIN 300 MG: 300 CAPSULE ORAL at 17:32

## 2019-11-16 RX ADMIN — FAMOTIDINE 20 MG: 20 TABLET ORAL at 09:57

## 2019-11-16 RX ADMIN — BACLOFEN 10 MG: 10 TABLET ORAL at 21:37

## 2019-11-16 RX ADMIN — SUCRALFATE 1 G: 1 TABLET ORAL at 09:57

## 2019-11-16 RX ADMIN — GABAPENTIN 300 MG: 300 CAPSULE ORAL at 09:57

## 2019-11-16 RX ADMIN — FAMOTIDINE 20 MG: 20 TABLET ORAL at 21:36

## 2019-11-16 RX ADMIN — GABAPENTIN 300 MG: 300 CAPSULE ORAL at 21:36

## 2019-11-16 RX ADMIN — PIPERACILLIN AND TAZOBACTAM 3.38 G: 3; .375 INJECTION, POWDER, LYOPHILIZED, FOR SOLUTION INTRAVENOUS at 21:36

## 2019-11-16 RX ADMIN — BACLOFEN 10 MG: 10 TABLET ORAL at 09:57

## 2019-11-16 RX ADMIN — ENOXAPARIN SODIUM 40 MG: 40 INJECTION SUBCUTANEOUS at 17:32

## 2019-11-16 RX ADMIN — PIPERACILLIN AND TAZOBACTAM 3.38 G: 3; .375 INJECTION, POWDER, LYOPHILIZED, FOR SOLUTION INTRAVENOUS at 04:44

## 2019-11-16 NOTE — PLAN OF CARE
Problem: Patient Care Overview  Goal: Plan of Care Review  Outcome: Ongoing (interventions implemented as appropriate)    Goal: Individualization and Mutuality  Outcome: Ongoing (interventions implemented as appropriate)    Goal: Discharge Needs Assessment  Outcome: Ongoing (interventions implemented as appropriate)      Problem: Skin Injury Risk (Adult)  Goal: Identify Related Risk Factors and Signs and Symptoms  Outcome: Ongoing (interventions implemented as appropriate)    Goal: Skin Health and Integrity  Outcome: Ongoing (interventions implemented as appropriate)      Problem: Fall Risk (Adult)  Goal: Identify Related Risk Factors and Signs and Symptoms  Outcome: Ongoing (interventions implemented as appropriate)    Goal: Absence of Fall  Outcome: Ongoing (interventions implemented as appropriate)      Problem: Surgery Nonspecified (Adult)  Goal: Signs and Symptoms of Listed Potential Problems Will be Absent, Minimized or Managed (Surgery Nonspecified)  Outcome: Ongoing (interventions implemented as appropriate)

## 2019-11-16 NOTE — PLAN OF CARE
Problem: Surgery Nonspecified (Adult)  Goal: Signs and Symptoms of Listed Potential Problems Will be Absent, Minimized or Managed (Surgery Nonspecified)  Outcome: Ongoing (interventions implemented as appropriate)

## 2019-11-16 NOTE — THERAPY TREATMENT NOTE
Acute Care - Physical Therapy Treatment Note   [facility]     Patient Name: Mamadou Romeo  : 1945  MRN: [id number]  Today's Date: 2019             Admit Date: 2019    Visit Dx:  No diagnosis found.  Patient Active Problem List   Diagnosis   (none) - all problems resolved or deleted       Therapy Treatment    Rehabilitation Treatment Summary     Row Name 1910             Treatment Time/Intention    Discipline  physical therapy assistant  -MC      Document Type  therapy note (daily note)  -MC      Subjective Information  complains of;weakness;pain  -MC      Mode of Treatment  physical therapy  -MC      Therapy Frequency (PT Clinical Impression)  3 times/wk  -MC      Existing Precautions/Restrictions  -- falls, easily anxious, any  -MC      Recorded by [] Evelin Mane, [facility] 19 1501      Row Name 19 0976             Vital Signs    Pre Patient Position  -- bed, alarm  -MC      Intra Patient Position  -- EOB to eat breakfast  -MC      Post Patient Position  -- wc, alarm  -MC      Recorded by [] Evelin Mane, [facility] 19 1504      Row Name 19 0910             Bed Mobility Assessment/Treatment    Supine-Sit-Supine [place] (Bed Mobility)  minimum assist (75% patient effort) LR , and use of BR, extra time  -MC      Recorded by [] Evelin Mane, [facility] 19 150      Row Name 19 0985             Bed-Chair Transfer    Bed-Chair [place] (Transfers)  -- minx2 using SB  -MC      Recorded by [] Evelin Mane, [facility] 19 150      Row Name 19 0951             Motor Skills Assessment/Interventions    Additional Documentation  Neuromuscular Re-education (Group)  -MC      Recorded by [] Evelin Mane, PTA 19 1506      Row Name 19 09             Static Sitting Balance    Comment (Unsupported Sitting, Static Balance)  -- sat unsupported x 20 mins to eat breakfast  -MC      Recorded by [] Evelin Mane, [facility] 19  1508      Row Name 11/16/19 0910             Pain Assessment    Additional Documentation  -- abdominal pain but did not rate  -      Recorded by [] Evelin Mane, PTA 11/16/19 1508      Row Name                Wound 11/12/19 1310 Bilateral abdomen Incision    Wound - Properties Group Date first assessed: 11/12/19 [PH] Time first assessed: 1310 [PH] Side: Bilateral [PH] Location: abdomen [PH] Primary Wound Type: Incision [PH] Recorded by:  [PH] Arlin Lau RN 11/12/19 1310    Row Name 11/16/19 0910             Outcome Summary/Treatment Plan (PT)    Anticipated Discharge Disposition (PT)  inpatient rehabilitation facility  -      Recorded by [] Evelin Mane PTA 11/16/19 1508        User Key  (r) = Recorded By, (t) = Taken By, (c) = Cosigned By    Initials Name Effective Dates Discipline     Arlin Lau RN 06/16/16 -  Nurse     Evelin Mane PTA 03/01/19 -  PT          Wound 11/12/19 1310 Bilateral abdomen Incision (Active)   Dressing Appearance intact;moist drainage 11/16/2019  7:15 AM   Closure DARRICK 11/16/2019  7:15 AM   Base dressing in place, unable to visualize 11/16/2019  7:15 AM   Drainage Characteristics/Odor sanguineous 11/16/2019  7:15 AM   Drainage Amount moderate 11/16/2019  7:15 AM   Dressing Care, Wound border dressing 11/15/2019 11:30 PM           Physical Therapy Education     Title: PT OT SLP Therapies (Done)     Topic: Physical Therapy (Done)     Point: Mobility training (Done)     Learning Progress Summary           Patient Acceptance, E,TB, VU,NR by MC at 11/16/2019  3:09 PM    Acceptance, E, VU by SC at 11/15/2019  1:48 PM    Acceptance, E,TB, VU by AO at 11/14/2019  3:53 PM    Comment:  Educted pt on importance of mobility and upright positioning for pain relief, symptoms of GERD/nausea, prevention of PNA and blood clots, and to maintain strength. Educated pt on safe bed mobility and static sitting balance EOB. Educated pt on POC.   Family Acceptance,  E,TB, VU,NR by  at 11/16/2019  3:09 PM    Acceptance, E, VU by SC at 11/15/2019  1:48 PM                               User Key     Initials Effective Dates Name Provider Type Discipline    AO 03/01/19 -  Rayne Gonzalez, PT Physical Therapist PT    SC 03/01/19 -  Riya Escalona, PTA Physical Therapy Assistant PT     03/01/19 -  Evelin Mane PTA Physical Therapy Assistant PT                PT Recommendation and Plan  Anticipated Discharge Disposition (PT): inpatient rehabilitation facility  Therapy Frequency (PT Clinical Impression): 3 times/wk  Outcome Summary/Treatment Plan (PT)  Anticipated Discharge Disposition (PT): inpatient rehabilitation facility  Plan of Care Reviewed With: patient, spouse  Progress: no change  Outcome Summary: pt needs much encouragement to do more for himself, Should be able to recover to using SB with just his wife after short rehab stay     Time Calculation:   PT Charges     Row Name 11/16/19 1509             Time Calculation    Start Time  0910  -      Stop Time  0945  -      Time Calculation (min)  35 min  -      PT Received On  11/16/19  -      PT - Next Appointment  11/18/19  -         Time Calculation- PT    Total Timed Code Minutes- PT  35 minute(s)  -         Timed Charges    83318 -  PT Neuromuscular Reeducation Minutes  20  -      41974 - PT Therapeutic Activity Minutes  15  -        User Key  (r) = Recorded By, (t) = Taken By, (c) = Cosigned By    Initials Name Provider Type     Evelin Mane, MITCH Physical Therapy Assistant        Therapy Charges for Today     Code Description Service Date Service Provider Modifiers Qty    78113697741 HC PT NEUROMUSC RE EDUCATION EA 15 MIN 11/16/2019 Evelin Mane, MITCH GP 1    83587471321 HC PT THERAPEUTIC ACT EA 15 MIN 11/16/2019 Evelin Mane, MITCH GP 1          PT G-Codes  Outcome Measure Options: AM-PAC 6 Clicks Basic Mobility (PT)  AM-PAC 6 Clicks Score (PT): 9    Evelin Mane  PTA  11/16/2019

## 2019-11-16 NOTE — PLAN OF CARE
Problem: Patient Care Overview  Goal: Plan of Care Review  Outcome: Ongoing (interventions implemented as appropriate)   11/16/19 0900   Coping/Psychosocial   Plan of Care Reviewed With patient;spouse   Plan of Care Review   Progress no change   OTHER   Outcome Summary pt needs much encouragement to do more for himself, Should be able to recover to using SB with just his wife after short rehab stay

## 2019-11-16 NOTE — PROGRESS NOTES
Patient had bowel movement this morning.  Tolerating clear liquids but is not really interested in having clear liquids    Vitals:    11/16/19 0518   BP: 100/59   Pulse: 84   Resp: 14   Temp: 98.3 °F (36.8 °C)   SpO2: 98%     He did have a fever of 100.1 last night      Lungs clear  Abdomen incision healing well nondistended  Extremities not edematous      Assessment and plan    Postoperative day 3 colostomy reversal    Having bowel function will advance diet to full liquids.  Was able to get up to a chair yesterday continue physical therapy.  He is able to void.

## 2019-11-17 LAB
ALBUMIN SERPL-MCNC: 2.4 G/DL (ref 3.5–5.2)
ALBUMIN/GLOB SERPL: 1.1 G/DL
ALP SERPL-CCNC: 62 U/L (ref 39–117)
ALT SERPL W P-5'-P-CCNC: 5 U/L (ref 1–41)
ANION GAP SERPL CALCULATED.3IONS-SCNC: 8 MMOL/L (ref 5–15)
AST SERPL-CCNC: 16 U/L (ref 1–40)
BASOPHILS # BLD AUTO: 0 10*3/MM3 (ref 0–0.2)
BASOPHILS NFR BLD AUTO: 0.8 % (ref 0–1.5)
BILIRUB SERPL-MCNC: 0.3 MG/DL (ref 0.2–1.2)
BUN BLD-MCNC: 4 MG/DL (ref 8–23)
BUN/CREAT SERPL: 6.5 (ref 7–25)
CALCIUM SPEC-SCNC: 7.8 MG/DL (ref 8.6–10.5)
CHLORIDE SERPL-SCNC: 109 MMOL/L (ref 98–107)
CO2 SERPL-SCNC: 26 MMOL/L (ref 22–29)
CREAT BLD-MCNC: 0.62 MG/DL (ref 0.76–1.27)
DEPRECATED RDW RBC AUTO: 43.3 FL (ref 37–54)
EOSINOPHIL # BLD AUTO: 0.5 10*3/MM3 (ref 0–0.4)
EOSINOPHIL NFR BLD AUTO: 10 % (ref 0.3–6.2)
ERYTHROCYTE [DISTWIDTH] IN BLOOD BY AUTOMATED COUNT: 13.7 % (ref 12.3–15.4)
GFR SERPL CREATININE-BSD FRML MDRD: 126 ML/MIN/1.73
GLOBULIN UR ELPH-MCNC: 2.2 GM/DL
GLUCOSE BLD-MCNC: 92 MG/DL (ref 65–99)
HCT VFR BLD AUTO: 27.1 % (ref 37.5–51)
HGB BLD-MCNC: 9.3 G/DL (ref 13–17.7)
LYMPHOCYTES # BLD AUTO: 0.9 10*3/MM3 (ref 0.7–3.1)
LYMPHOCYTES NFR BLD AUTO: 17 % (ref 19.6–45.3)
MCH RBC QN AUTO: 30.8 PG (ref 26.6–33)
MCHC RBC AUTO-ENTMCNC: 34.3 G/DL (ref 31.5–35.7)
MCV RBC AUTO: 89.6 FL (ref 79–97)
MONOCYTES # BLD AUTO: 0.6 10*3/MM3 (ref 0.1–0.9)
MONOCYTES NFR BLD AUTO: 11.4 % (ref 5–12)
NEUTROPHILS # BLD AUTO: 3.3 10*3/MM3 (ref 1.7–7)
NEUTROPHILS NFR BLD AUTO: 60.8 % (ref 42.7–76)
NRBC BLD AUTO-RTO: 0 /100 WBC (ref 0–0.2)
PLATELET # BLD AUTO: 151 10*3/MM3 (ref 140–450)
PMV BLD AUTO: 8.2 FL (ref 6–12)
POTASSIUM BLD-SCNC: 2.9 MMOL/L (ref 3.5–5.2)
PROT SERPL-MCNC: 4.6 G/DL (ref 6–8.5)
RBC # BLD AUTO: 3.02 10*6/MM3 (ref 4.14–5.8)
SODIUM BLD-SCNC: 143 MMOL/L (ref 136–145)
WBC NRBC COR # BLD: 5.4 10*3/MM3 (ref 3.4–10.8)

## 2019-11-17 PROCEDURE — 25010000002 ENOXAPARIN PER 10 MG: Performed by: SURGERY

## 2019-11-17 PROCEDURE — 85025 COMPLETE CBC W/AUTO DIFF WBC: CPT | Performed by: SURGERY

## 2019-11-17 PROCEDURE — 25010000002 PIPERACILLIN SOD-TAZOBACTAM PER 1 G: Performed by: SURGERY

## 2019-11-17 PROCEDURE — 80053 COMPREHEN METABOLIC PANEL: CPT | Performed by: SURGERY

## 2019-11-17 PROCEDURE — 99024 POSTOP FOLLOW-UP VISIT: CPT | Performed by: SURGERY

## 2019-11-17 RX ADMIN — GABAPENTIN 300 MG: 300 CAPSULE ORAL at 15:39

## 2019-11-17 RX ADMIN — FAMOTIDINE 20 MG: 20 TABLET ORAL at 21:06

## 2019-11-17 RX ADMIN — GABAPENTIN 300 MG: 300 CAPSULE ORAL at 21:06

## 2019-11-17 RX ADMIN — ENOXAPARIN SODIUM 40 MG: 40 INJECTION SUBCUTANEOUS at 15:40

## 2019-11-17 RX ADMIN — GABAPENTIN 300 MG: 300 CAPSULE ORAL at 09:17

## 2019-11-17 RX ADMIN — SODIUM CHLORIDE, PRESERVATIVE FREE 3 ML: 5 INJECTION INTRAVENOUS at 09:19

## 2019-11-17 RX ADMIN — BACLOFEN 10 MG: 10 TABLET ORAL at 21:06

## 2019-11-17 RX ADMIN — SUCRALFATE 1 G: 1 TABLET ORAL at 09:17

## 2019-11-17 RX ADMIN — FAMOTIDINE 20 MG: 20 TABLET ORAL at 09:17

## 2019-11-17 RX ADMIN — PIPERACILLIN AND TAZOBACTAM 3.38 G: 3; .375 INJECTION, POWDER, LYOPHILIZED, FOR SOLUTION INTRAVENOUS at 03:43

## 2019-11-17 RX ADMIN — SODIUM CHLORIDE, PRESERVATIVE FREE 3 ML: 5 INJECTION INTRAVENOUS at 21:14

## 2019-11-17 RX ADMIN — SUCRALFATE 1 G: 1 TABLET ORAL at 18:19

## 2019-11-17 RX ADMIN — PIPERACILLIN AND TAZOBACTAM 3.38 G: 3; .375 INJECTION, POWDER, LYOPHILIZED, FOR SOLUTION INTRAVENOUS at 20:50

## 2019-11-17 RX ADMIN — BACLOFEN 10 MG: 10 TABLET ORAL at 09:17

## 2019-11-17 RX ADMIN — PIPERACILLIN AND TAZOBACTAM 3.38 G: 3; .375 INJECTION, POWDER, LYOPHILIZED, FOR SOLUTION INTRAVENOUS at 15:40

## 2019-11-17 NOTE — PLAN OF CARE
Problem: Patient Care Overview  Goal: Plan of Care Review  Outcome: Ongoing (interventions implemented as appropriate)    Goal: Individualization and Mutuality  Outcome: Ongoing (interventions implemented as appropriate)    Goal: Discharge Needs Assessment  Outcome: Ongoing (interventions implemented as appropriate)    Goal: Interprofessional Rounds/Family Conf  Outcome: Ongoing (interventions implemented as appropriate)      Problem: Skin Injury Risk (Adult)  Goal: Identify Related Risk Factors and Signs and Symptoms  Outcome: Ongoing (interventions implemented as appropriate)    Goal: Skin Health and Integrity  Outcome: Ongoing (interventions implemented as appropriate)      Problem: Fall Risk (Adult)  Goal: Identify Related Risk Factors and Signs and Symptoms  Outcome: Ongoing (interventions implemented as appropriate)    Goal: Absence of Fall  Outcome: Ongoing (interventions implemented as appropriate)

## 2019-11-17 NOTE — PROGRESS NOTES
S/p colostomy reversal      Tolerating low residue diet, no bm since yesterday morning    Vitals:    11/16/19 0518 11/16/19 1452 11/16/19 2100 11/17/19 0520   BP: 100/59 121/70 108/55 111/63   BP Location: Left arm Right arm Right arm    Patient Position: Lying Lying Lying    Pulse: 84 64 65 74   Resp: 14 16 18 16   Temp: 98.3 °F (36.8 °C) 97.6 °F (36.4 °C) 98.8 °F (37.1 °C) 98.7 °F (37.1 °C)   TempSrc: Oral      SpO2: 98% 96% 94% 92%   Weight:       Height:             abd RADHIKA SS, no distention, incision no erythema  Ex no edema    A/p    S/p colostomy reversal  -doing well, to rehab tomorrow

## 2019-11-18 VITALS
SYSTOLIC BLOOD PRESSURE: 109 MMHG | BODY MASS INDEX: 20.9 KG/M2 | RESPIRATION RATE: 16 BRPM | OXYGEN SATURATION: 91 % | WEIGHT: 130.07 LBS | TEMPERATURE: 98.3 F | DIASTOLIC BLOOD PRESSURE: 69 MMHG | HEIGHT: 66 IN | HEART RATE: 61 BPM

## 2019-11-18 LAB — POTASSIUM BLD-SCNC: 3.4 MMOL/L (ref 3.5–5.2)

## 2019-11-18 PROCEDURE — 25010000002 ENOXAPARIN PER 10 MG: Performed by: SURGERY

## 2019-11-18 PROCEDURE — 25010000002 PIPERACILLIN SOD-TAZOBACTAM PER 1 G: Performed by: SURGERY

## 2019-11-18 PROCEDURE — 84132 ASSAY OF SERUM POTASSIUM: CPT | Performed by: SURGERY

## 2019-11-18 RX ORDER — POTASSIUM CHLORIDE 7.45 MG/ML
10 INJECTION INTRAVENOUS
Status: DISCONTINUED | OUTPATIENT
Start: 2019-11-18 | End: 2019-11-18 | Stop reason: HOSPADM

## 2019-11-18 RX ORDER — POTASSIUM CHLORIDE 1.5 G/1.77G
40 POWDER, FOR SOLUTION ORAL AS NEEDED
Status: DISCONTINUED | OUTPATIENT
Start: 2019-11-18 | End: 2019-11-18 | Stop reason: HOSPADM

## 2019-11-18 RX ORDER — POTASSIUM CHLORIDE 20 MEQ/1
40 TABLET, EXTENDED RELEASE ORAL AS NEEDED
Status: DISCONTINUED | OUTPATIENT
Start: 2019-11-18 | End: 2019-11-18 | Stop reason: HOSPADM

## 2019-11-18 RX ADMIN — POTASSIUM CHLORIDE 40 MEQ: 1500 TABLET, EXTENDED RELEASE ORAL at 13:21

## 2019-11-18 RX ADMIN — SUCRALFATE 1 G: 1 TABLET ORAL at 08:41

## 2019-11-18 RX ADMIN — HYDROCODONE BITARTRATE AND ACETAMINOPHEN 1 TABLET: 10; 325 TABLET ORAL at 08:41

## 2019-11-18 RX ADMIN — POTASSIUM CHLORIDE 40 MEQ: 1500 TABLET, EXTENDED RELEASE ORAL at 09:58

## 2019-11-18 RX ADMIN — SODIUM CHLORIDE, PRESERVATIVE FREE 3 ML: 5 INJECTION INTRAVENOUS at 08:42

## 2019-11-18 RX ADMIN — PIPERACILLIN AND TAZOBACTAM 3.38 G: 3; .375 INJECTION, POWDER, LYOPHILIZED, FOR SOLUTION INTRAVENOUS at 04:59

## 2019-11-18 RX ADMIN — HYDROCODONE BITARTRATE AND ACETAMINOPHEN 1 TABLET: 10; 325 TABLET ORAL at 13:22

## 2019-11-18 RX ADMIN — SUCRALFATE 1 G: 1 TABLET ORAL at 13:22

## 2019-11-18 RX ADMIN — ENOXAPARIN SODIUM 40 MG: 40 INJECTION SUBCUTANEOUS at 15:49

## 2019-11-18 RX ADMIN — PIPERACILLIN AND TAZOBACTAM 3.38 G: 3; .375 INJECTION, POWDER, LYOPHILIZED, FOR SOLUTION INTRAVENOUS at 13:21

## 2019-11-18 RX ADMIN — BACLOFEN 10 MG: 10 TABLET ORAL at 08:41

## 2019-11-18 RX ADMIN — FAMOTIDINE 20 MG: 20 TABLET ORAL at 08:40

## 2019-11-18 RX ADMIN — GABAPENTIN 300 MG: 300 CAPSULE ORAL at 15:49

## 2019-11-18 RX ADMIN — GABAPENTIN 300 MG: 300 CAPSULE ORAL at 08:41

## 2019-11-18 NOTE — DISCHARGE SUMMARY
Date of Discharge:  11/18/2019    Discharge Diagnosis: Status post colostomy reversal    Presenting Problem/History of Present Illness  Active Hospital Problems   No active problems to display.      Resolved Hospital Problems    Diagnosis Date Resolved POA   • History of colostomy reversal [Z98.890] 11/12/2019 Not Applicable          Hospital Course  Patient was a planned postop admission status post colostomy takedown which he tolerated well.  He had slow return of GI function.  As his GI function returned his diet was advanced.  His Dugan catheter was discontinued without issue.  Despite his limited mobility he was able to get out of bed and transfer with assistance to a chair.  He is now ready for transfer to rehab closer to home in [place]    Procedures Performed    Procedure(s):  COLOSTOMY TAKEDOWN OR CLOSURE  -------------------        Consults:   Consults     No orders found from 10/14/2019 to 11/13/2019.          Condition on Discharge: Stable    Vital Signs  Temp:  [98.1 °F (36.7 °C)-99.1 °F (37.3 °C)] 98.1 °F (36.7 °C)  Heart Rate:  [58-68] 58  Resp:  [15-18] 15  BP: (123-134)/(72-78) 123/72    Physical Exam:     General Appearance:    Alert, cooperative, in no acute distress   Head:    Normocephalic, without obvious abnormality, atraumatic   Eyes:            Lids and lashes normal, conjunctivae and sclerae normal, no   icterus, no pallor, corneas clear   Ears:    Ears appear intact with no abnormalities noted   Neck:   trachea midline    Lungs:     respirations regular, even and  unlabored    CV:    Good color no pallor or cyanosis   Abdomen:    Soft nondistended nontender incision clean and dry   Extremities:   Moves all extremities well   Skin:   No breakdown   Neurologic:   Cranial nerves 2 - 12 grossly intact       Discharge Disposition  Skilled Nursing Facility (DC - External)    Discharge Medications     Discharge Medications      Continue These Medications      Instructions Start Date    baclofen 10 MG tablet  Commonly known as:  LIORESAL   10 mg, Oral, 2 Times Daily      diphenhydrAMINE-acetaminophen  MG tablet per tablet  Commonly known as:  TYLENOL PM   1 tablet, Oral, Nightly PRN      docusate calcium 240 MG capsule  Commonly known as:  SURFAK   240 mg, Oral, 2 Times Daily      gabapentin 300 MG capsule  Commonly known as:  NEURONTIN   300 mg, Oral, 3 Times Daily      HYDROcodone-acetaminophen 7.5-325 MG per tablet  Commonly known as:  NORCO   1 tablet, Oral, Every 6 Hours PRN      MULTIPLE VITAMIN PO   1 tablet, Oral, Daily      omeprazole 40 MG capsule  Commonly known as:  priLOSEC   40 mg, Oral, Daily      traZODone 50 MG tablet  Commonly known as:  DESYREL   TAKE 1 TABLET (50 MG) BY MOUTH AT BEDTIME             Discharge Diet:   Diet Instructions     Diet:      Diet Texture / Consistency:  Regular          Activity at Discharge:   Activity Instructions     Discharge Activity      1) No driving for 3 days or no longer taking narcotics.   2) Return to school / work as discussed  3) May shower / sponge bathe for 48 hours.  4) Do not lift / push / pull more then 10 lbs.          Follow-up Appointments  Future Appointments   Date Time Provider Department Center   1/8/2020 10:30 AM Ekta White MD [facility] None     Additional Instructions for the Follow-ups that You Need to Schedule     Discharge Follow-up with Specified Provider: Dr. Collins's office; 2 Weeks   As directed      To:  Dr. Collins's office    Follow Up:  2 Weeks         Notify Physician or Go To The ED For the Following Conditions   As directed      Any increasing pain, fever, drainage or other concerns    Order Comments:  Any increasing pain, fever, drainage or other concerns                Test Results Pending at Discharge       Carole Collins MD  11/18/19  1:33 PM

## 2019-11-18 NOTE — PROGRESS NOTES
Continued Stay Note  MARK Billy     Patient Name: Mamadou Romeo  MRN: [id number]  Today's Date: 11/18/2019    Admit Date: 11/12/2019    Discharge Plan     Row Name 11/18/19 1513       Plan    Plan Comments  On 11/18 pt [facility] for next [facility]. Pt is ready to DC 11/18. [facility]able to [facility]        Discharge Codes    No documentation.       Expected Discharge Date and Time     Expected Discharge Date Expected Discharge Time    Nov 18, 2019         KAMERON Chung    Phone # [phone]  Cell #[phone]  Fax#[phone]  [email or web address]      KAMERON Chung

## 2019-11-18 NOTE — PLAN OF CARE
Problem: Patient Care Overview  Goal: Plan of Care Review  Outcome: Ongoing (interventions implemented as appropriate)    Goal: Individualization and Mutuality  Outcome: Ongoing (interventions implemented as appropriate)    Goal: Discharge Needs Assessment  Outcome: Ongoing (interventions implemented as appropriate)    Goal: Interprofessional Rounds/Family Conf  Outcome: Ongoing (interventions implemented as appropriate)      Problem: Skin Injury Risk (Adult)  Goal: Identify Related Risk Factors and Signs and Symptoms  Outcome: Ongoing (interventions implemented as appropriate)    Goal: Skin Health and Integrity  Outcome: Ongoing (interventions implemented as appropriate)      Problem: Fall Risk (Adult)  Goal: Identify Related Risk Factors and Signs and Symptoms  Outcome: Ongoing (interventions implemented as appropriate)    Goal: Absence of Fall  Outcome: Ongoing (interventions implemented as appropriate)      Problem: Surgery Nonspecified (Adult)  Goal: Signs and Symptoms of Listed Potential Problems Will be Absent, Minimized or Managed (Surgery Nonspecified)  Outcome: Ongoing (interventions implemented as appropriate)    Goal: Anesthesia/Sedation Recovery  Outcome: Ongoing (interventions implemented as appropriate)

## 2019-11-18 NOTE — PLAN OF CARE
Problem: Patient Care Overview  Goal: Plan of Care Review  Outcome: Ongoing (interventions implemented as appropriate)    Goal: Individualization and Mutuality  Outcome: Ongoing (interventions implemented as appropriate)    Goal: Discharge Needs Assessment  Outcome: Ongoing (interventions implemented as appropriate)      Problem: Fall Risk (Adult)  Goal: Identify Related Risk Factors and Signs and Symptoms  Outcome: Ongoing (interventions implemented as appropriate)    Goal: Absence of Fall  Outcome: Ongoing (interventions implemented as appropriate)      Problem: Surgery Nonspecified (Adult)  Goal: Signs and Symptoms of Listed Potential Problems Will be Absent, Minimized or Managed (Surgery Nonspecified)  Outcome: Ongoing (interventions implemented as appropriate)    Goal: Anesthesia/Sedation Recovery  Outcome: Ongoing (interventions implemented as appropriate)

## 2019-11-19 NOTE — PROGRESS NOTES
Case Management Discharge Note      Final Note: [facility]              Final Discharge Disposition Code: 03 - skilled nursing facility (SNF)

## 2019-11-23 ENCOUNTER — HOSPITAL ENCOUNTER (INPATIENT)
Facility: HOSPITAL | Age: 74
LOS: 4 days | Discharge: NURSING, REHAB OR LONG-TERM CARE | End: 2019-11-27
Attending: SURGERY | Admitting: INTERNAL MEDICINE

## 2019-11-23 PROBLEM — K56.609 SMALL BOWEL OBSTRUCTION (HCC): Status: ACTIVE | Noted: 2019-11-23

## 2019-11-23 PROCEDURE — 87040 BLOOD CULTURE FOR BACTERIA: CPT | Performed by: SURGERY

## 2019-11-23 PROCEDURE — 99231 SBSQ HOSP IP/OBS SF/LOW 25: CPT | Performed by: PHYSICIAN ASSISTANT

## 2019-11-23 PROCEDURE — 25010000002 PIPERACILLIN SOD-TAZOBACTAM PER 1 G: Performed by: SURGERY

## 2019-11-23 RX ORDER — SODIUM CHLORIDE 0.9 % (FLUSH) 0.9 %
10 SYRINGE (ML) INJECTION EVERY 12 HOURS SCHEDULED
Status: DISCONTINUED | OUTPATIENT
Start: 2019-11-23 | End: 2019-11-27 | Stop reason: HOSPADM

## 2019-11-23 RX ORDER — GABAPENTIN 300 MG/1
300 CAPSULE ORAL 3 TIMES DAILY
COMMUNITY
Start: 2019-11-19 | End: 2020-10-18

## 2019-11-23 RX ORDER — ACETAMINOPHEN 325 MG/1
650 TABLET ORAL EVERY 4 HOURS PRN
COMMUNITY
Start: 2019-11-18 | End: 2022-08-14

## 2019-11-23 RX ORDER — ACETAMINOPHEN 650 MG/1
1000 SUPPOSITORY RECTAL EVERY 6 HOURS PRN
Status: DISCONTINUED | OUTPATIENT
Start: 2019-11-23 | End: 2019-11-27 | Stop reason: HOSPADM

## 2019-11-23 RX ORDER — MORPHINE SULFATE 4 MG/ML
2 INJECTION, SOLUTION INTRAMUSCULAR; INTRAVENOUS
Status: DISCONTINUED | OUTPATIENT
Start: 2019-11-23 | End: 2019-11-27 | Stop reason: HOSPADM

## 2019-11-23 RX ORDER — BACLOFEN 10 MG/1
10 TABLET ORAL 2 TIMES DAILY
Status: ON HOLD | COMMUNITY
End: 2019-11-23

## 2019-11-23 RX ORDER — POLYETHYLENE GLYCOL 3350 17 G/17G
17 POWDER, FOR SOLUTION ORAL DAILY
COMMUNITY
Start: 2019-11-19 | End: 2022-08-15

## 2019-11-23 RX ORDER — TRAZODONE HYDROCHLORIDE 50 MG/1
50 TABLET ORAL NIGHTLY
Status: ON HOLD | COMMUNITY
End: 2019-11-23

## 2019-11-23 RX ORDER — ONDANSETRON 2 MG/ML
4 INJECTION INTRAMUSCULAR; INTRAVENOUS EVERY 6 HOURS PRN
Status: DISCONTINUED | OUTPATIENT
Start: 2019-11-23 | End: 2019-11-27 | Stop reason: HOSPADM

## 2019-11-23 RX ORDER — SODIUM CHLORIDE 0.9 % (FLUSH) 0.9 %
10 SYRINGE (ML) INJECTION AS NEEDED
Status: DISCONTINUED | OUTPATIENT
Start: 2019-11-23 | End: 2019-11-27 | Stop reason: HOSPADM

## 2019-11-23 RX ORDER — SODIUM CHLORIDE 9 MG/ML
100 INJECTION, SOLUTION INTRAVENOUS CONTINUOUS
Status: DISCONTINUED | OUTPATIENT
Start: 2019-11-23 | End: 2019-11-26

## 2019-11-23 RX ORDER — HYDROCODONE BITARTRATE AND ACETAMINOPHEN 7.5; 325 MG/1; MG/1
1 TABLET ORAL EVERY 6 HOURS PRN
COMMUNITY
Start: 2019-11-19 | End: 2019-11-27 | Stop reason: HOSPADM

## 2019-11-23 RX ORDER — ONDANSETRON 4 MG/1
4 TABLET, FILM COATED ORAL EVERY 6 HOURS PRN
COMMUNITY
Start: 2019-11-23 | End: 2022-11-24

## 2019-11-23 RX ADMIN — SODIUM CHLORIDE 100 ML/HR: 900 INJECTION, SOLUTION INTRAVENOUS at 19:56

## 2019-11-23 RX ADMIN — Medication 10 ML: at 21:00

## 2019-11-23 RX ADMIN — ACETAMINOPHEN 975 MG: 650 SUPPOSITORY RECTAL at 21:22

## 2019-11-23 RX ADMIN — PIPERACILLIN AND TAZOBACTAM 3.38 G: 3; .375 INJECTION, POWDER, LYOPHILIZED, FOR SOLUTION INTRAVENOUS at 19:55

## 2019-11-24 PROBLEM — K21.9 GASTROESOPHAGEAL REFLUX DISEASE WITHOUT ESOPHAGITIS: Status: ACTIVE | Noted: 2019-11-24

## 2019-11-24 PROBLEM — M41.50 DEGENERATIVE SCOLIOSIS: Status: ACTIVE | Noted: 2019-11-24

## 2019-11-24 LAB
ABO GROUP BLD: NORMAL
ALBUMIN SERPL-MCNC: 2.7 G/DL (ref 3.5–5.2)
ALBUMIN/GLOB SERPL: 0.9 G/DL
ALP SERPL-CCNC: 92 U/L (ref 39–117)
ALT SERPL W P-5'-P-CCNC: 9 U/L (ref 1–41)
ANION GAP SERPL CALCULATED.3IONS-SCNC: 12 MMOL/L (ref 5–15)
AST SERPL-CCNC: 17 U/L (ref 1–40)
BILIRUB SERPL-MCNC: 0.6 MG/DL (ref 0.2–1.2)
BLD GP AB SCN SERPL QL: NEGATIVE
BUN BLD-MCNC: 8 MG/DL (ref 8–23)
BUN/CREAT SERPL: 12.3 (ref 7–25)
CALCIUM SPEC-SCNC: 8.2 MG/DL (ref 8.6–10.5)
CHLORIDE SERPL-SCNC: 105 MMOL/L (ref 98–107)
CO2 SERPL-SCNC: 21 MMOL/L (ref 22–29)
CREAT BLD-MCNC: 0.65 MG/DL (ref 0.76–1.27)
DEPRECATED RDW RBC AUTO: 44.2 FL (ref 37–54)
ERYTHROCYTE [DISTWIDTH] IN BLOOD BY AUTOMATED COUNT: 14.2 % (ref 12.3–15.4)
GFR SERPL CREATININE-BSD FRML MDRD: 120 ML/MIN/1.73
GLOBULIN UR ELPH-MCNC: 3 GM/DL
GLUCOSE BLD-MCNC: 88 MG/DL (ref 65–99)
HCT VFR BLD AUTO: 29.6 % (ref 37.5–51)
HGB BLD-MCNC: 10.1 G/DL (ref 13–17.7)
INR PPP: 1.15 (ref 0.9–1.1)
MCH RBC QN AUTO: 30.4 PG (ref 26.6–33)
MCHC RBC AUTO-ENTMCNC: 34.2 G/DL (ref 31.5–35.7)
MCV RBC AUTO: 88.7 FL (ref 79–97)
PLATELET # BLD AUTO: 228 10*3/MM3 (ref 140–450)
PMV BLD AUTO: 7.7 FL (ref 6–12)
POTASSIUM BLD-SCNC: 3.4 MMOL/L (ref 3.5–5.2)
PROT SERPL-MCNC: 5.7 G/DL (ref 6–8.5)
PROTHROMBIN TIME: 11.8 SECONDS (ref 9.6–11.7)
RBC # BLD AUTO: 3.33 10*6/MM3 (ref 4.14–5.8)
RH BLD: POSITIVE
SODIUM BLD-SCNC: 138 MMOL/L (ref 136–145)
T&S EXPIRATION DATE: NORMAL
WBC NRBC COR # BLD: 16.7 10*3/MM3 (ref 3.4–10.8)

## 2019-11-24 PROCEDURE — 86901 BLOOD TYPING SEROLOGIC RH(D): CPT | Performed by: SURGERY

## 2019-11-24 PROCEDURE — 99233 SBSQ HOSP IP/OBS HIGH 50: CPT | Performed by: INTERNAL MEDICINE

## 2019-11-24 PROCEDURE — 93010 ELECTROCARDIOGRAM REPORT: CPT | Performed by: INTERNAL MEDICINE

## 2019-11-24 PROCEDURE — 86850 RBC ANTIBODY SCREEN: CPT | Performed by: SURGERY

## 2019-11-24 PROCEDURE — 25010000003 POTASSIUM CHLORIDE 10 MEQ/100ML SOLUTION: Performed by: INTERNAL MEDICINE

## 2019-11-24 PROCEDURE — 85027 COMPLETE CBC AUTOMATED: CPT | Performed by: SURGERY

## 2019-11-24 PROCEDURE — 25010000002 PIPERACILLIN SOD-TAZOBACTAM PER 1 G: Performed by: SURGERY

## 2019-11-24 PROCEDURE — 86900 BLOOD TYPING SEROLOGIC ABO: CPT | Performed by: SURGERY

## 2019-11-24 PROCEDURE — 25010000002 ONDANSETRON PER 1 MG: Performed by: SURGERY

## 2019-11-24 PROCEDURE — 85610 PROTHROMBIN TIME: CPT | Performed by: SURGERY

## 2019-11-24 PROCEDURE — 80053 COMPREHEN METABOLIC PANEL: CPT | Performed by: SURGERY

## 2019-11-24 PROCEDURE — 63710000001 DIPHENHYDRAMINE PER 50 MG: Performed by: NURSE PRACTITIONER

## 2019-11-24 PROCEDURE — 25010000002 MORPHINE PER 10 MG: Performed by: SURGERY

## 2019-11-24 PROCEDURE — 93005 ELECTROCARDIOGRAM TRACING: CPT | Performed by: SURGERY

## 2019-11-24 RX ORDER — MAGNESIUM SULFATE HEPTAHYDRATE 40 MG/ML
4 INJECTION, SOLUTION INTRAVENOUS AS NEEDED
Status: DISCONTINUED | OUTPATIENT
Start: 2019-11-24 | End: 2019-11-27 | Stop reason: HOSPADM

## 2019-11-24 RX ORDER — POTASSIUM CHLORIDE 7.45 MG/ML
10 INJECTION INTRAVENOUS
Status: DISCONTINUED | OUTPATIENT
Start: 2019-11-24 | End: 2019-11-27 | Stop reason: HOSPADM

## 2019-11-24 RX ORDER — DIPHENHYDRAMINE HCL 25 MG
25 TABLET ORAL EVERY 6 HOURS PRN
Status: DISCONTINUED | OUTPATIENT
Start: 2019-11-24 | End: 2019-11-27 | Stop reason: HOSPADM

## 2019-11-24 RX ORDER — MAGNESIUM SULFATE HEPTAHYDRATE 40 MG/ML
2 INJECTION, SOLUTION INTRAVENOUS AS NEEDED
Status: DISCONTINUED | OUTPATIENT
Start: 2019-11-24 | End: 2019-11-27 | Stop reason: HOSPADM

## 2019-11-24 RX ORDER — ACETAMINOPHEN 325 MG/1
650 TABLET ORAL EVERY 6 HOURS PRN
Status: DISCONTINUED | OUTPATIENT
Start: 2019-11-24 | End: 2019-11-27 | Stop reason: HOSPADM

## 2019-11-24 RX ORDER — PANTOPRAZOLE SODIUM 40 MG/10ML
40 INJECTION, POWDER, LYOPHILIZED, FOR SOLUTION INTRAVENOUS EVERY 12 HOURS SCHEDULED
Status: DISCONTINUED | OUTPATIENT
Start: 2019-11-24 | End: 2019-11-27

## 2019-11-24 RX ADMIN — PHENOL 2 SPRAY: 1.5 LIQUID ORAL at 18:18

## 2019-11-24 RX ADMIN — MORPHINE SULFATE 2 MG: 4 INJECTION INTRAVENOUS at 04:03

## 2019-11-24 RX ADMIN — ACETAMINOPHEN 650 MG: 325 TABLET ORAL at 21:56

## 2019-11-24 RX ADMIN — PANTOPRAZOLE SODIUM 40 MG: 40 INJECTION, POWDER, FOR SOLUTION INTRAVENOUS at 21:56

## 2019-11-24 RX ADMIN — PIPERACILLIN AND TAZOBACTAM 3.38 G: 3; .375 INJECTION, POWDER, LYOPHILIZED, FOR SOLUTION INTRAVENOUS at 18:16

## 2019-11-24 RX ADMIN — PIPERACILLIN AND TAZOBACTAM 3.38 G: 3; .375 INJECTION, POWDER, LYOPHILIZED, FOR SOLUTION INTRAVENOUS at 02:50

## 2019-11-24 RX ADMIN — ONDANSETRON 4 MG: 2 INJECTION INTRAMUSCULAR; INTRAVENOUS at 04:09

## 2019-11-24 RX ADMIN — DIPHENHYDRAMINE HCL 25 MG: 25 TABLET ORAL at 21:56

## 2019-11-24 RX ADMIN — POTASSIUM CHLORIDE 10 MEQ: 10 INJECTION, SOLUTION INTRAVENOUS at 16:18

## 2019-11-24 RX ADMIN — POTASSIUM CHLORIDE 10 MEQ: 10 INJECTION, SOLUTION INTRAVENOUS at 13:16

## 2019-11-24 RX ADMIN — PIPERACILLIN AND TAZOBACTAM 3.38 G: 3; .375 INJECTION, POWDER, LYOPHILIZED, FOR SOLUTION INTRAVENOUS at 11:14

## 2019-11-25 LAB
ANION GAP SERPL CALCULATED.3IONS-SCNC: 11 MMOL/L (ref 5–15)
BASOPHILS # BLD AUTO: 0 10*3/MM3 (ref 0–0.2)
BASOPHILS NFR BLD AUTO: 0.4 % (ref 0–1.5)
BUN BLD-MCNC: 11 MG/DL (ref 8–23)
BUN/CREAT SERPL: 13.9 (ref 7–25)
CALCIUM SPEC-SCNC: 8.1 MG/DL (ref 8.6–10.5)
CHLORIDE SERPL-SCNC: 103 MMOL/L (ref 98–107)
CO2 SERPL-SCNC: 23 MMOL/L (ref 22–29)
CREAT BLD-MCNC: 0.79 MG/DL (ref 0.76–1.27)
DEPRECATED RDW RBC AUTO: 43.3 FL (ref 37–54)
EOSINOPHIL # BLD AUTO: 0.5 10*3/MM3 (ref 0–0.4)
EOSINOPHIL NFR BLD AUTO: 5.6 % (ref 0.3–6.2)
ERYTHROCYTE [DISTWIDTH] IN BLOOD BY AUTOMATED COUNT: 13.7 % (ref 12.3–15.4)
GFR SERPL CREATININE-BSD FRML MDRD: 96 ML/MIN/1.73
GLUCOSE BLD-MCNC: 74 MG/DL (ref 65–99)
HCT VFR BLD AUTO: 28.5 % (ref 37.5–51)
HGB BLD-MCNC: 9.5 G/DL (ref 13–17.7)
LYMPHOCYTES # BLD AUTO: 0.7 10*3/MM3 (ref 0.7–3.1)
LYMPHOCYTES NFR BLD AUTO: 8 % (ref 19.6–45.3)
MAGNESIUM SERPL-MCNC: 1.8 MG/DL (ref 1.6–2.4)
MCH RBC QN AUTO: 29.4 PG (ref 26.6–33)
MCHC RBC AUTO-ENTMCNC: 33.3 G/DL (ref 31.5–35.7)
MCV RBC AUTO: 88.4 FL (ref 79–97)
MONOCYTES # BLD AUTO: 0.6 10*3/MM3 (ref 0.1–0.9)
MONOCYTES NFR BLD AUTO: 6.5 % (ref 5–12)
NEUTROPHILS # BLD AUTO: 6.8 10*3/MM3 (ref 1.7–7)
NEUTROPHILS NFR BLD AUTO: 79.5 % (ref 42.7–76)
NRBC BLD AUTO-RTO: 0 /100 WBC (ref 0–0.2)
PLATELET # BLD AUTO: 212 10*3/MM3 (ref 140–450)
PMV BLD AUTO: 8 FL (ref 6–12)
POTASSIUM BLD-SCNC: 3.2 MMOL/L (ref 3.5–5.2)
POTASSIUM BLD-SCNC: 3.4 MMOL/L (ref 3.5–5.2)
RBC # BLD AUTO: 3.22 10*6/MM3 (ref 4.14–5.8)
SODIUM BLD-SCNC: 137 MMOL/L (ref 136–145)
WBC NRBC COR # BLD: 8.5 10*3/MM3 (ref 3.4–10.8)

## 2019-11-25 PROCEDURE — 85025 COMPLETE CBC W/AUTO DIFF WBC: CPT | Performed by: INTERNAL MEDICINE

## 2019-11-25 PROCEDURE — 25010000002 MORPHINE PER 10 MG: Performed by: SURGERY

## 2019-11-25 PROCEDURE — 25010000002 PIPERACILLIN SOD-TAZOBACTAM PER 1 G: Performed by: SURGERY

## 2019-11-25 PROCEDURE — 99232 SBSQ HOSP IP/OBS MODERATE 35: CPT | Performed by: INTERNAL MEDICINE

## 2019-11-25 PROCEDURE — 80048 BASIC METABOLIC PNL TOTAL CA: CPT | Performed by: INTERNAL MEDICINE

## 2019-11-25 PROCEDURE — 25010000003 POTASSIUM CHLORIDE 10 MEQ/100ML SOLUTION: Performed by: INTERNAL MEDICINE

## 2019-11-25 PROCEDURE — 84132 ASSAY OF SERUM POTASSIUM: CPT | Performed by: INTERNAL MEDICINE

## 2019-11-25 PROCEDURE — 83735 ASSAY OF MAGNESIUM: CPT | Performed by: INTERNAL MEDICINE

## 2019-11-25 RX ORDER — POTASSIUM CHLORIDE 7.45 MG/ML
10 INJECTION INTRAVENOUS
Status: DISCONTINUED | OUTPATIENT
Start: 2019-11-25 | End: 2019-11-25

## 2019-11-25 RX ORDER — HYDROCODONE BITARTRATE AND ACETAMINOPHEN 7.5; 325 MG/1; MG/1
1 TABLET ORAL EVERY 6 HOURS PRN
Status: DISCONTINUED | OUTPATIENT
Start: 2019-11-25 | End: 2019-11-27 | Stop reason: HOSPADM

## 2019-11-25 RX ORDER — POTASSIUM CHLORIDE 20 MEQ/1
40 TABLET, EXTENDED RELEASE ORAL AS NEEDED
Status: DISCONTINUED | OUTPATIENT
Start: 2019-11-25 | End: 2019-11-27 | Stop reason: HOSPADM

## 2019-11-25 RX ORDER — POTASSIUM CHLORIDE 1.5 G/1.77G
40 POWDER, FOR SOLUTION ORAL AS NEEDED
Status: DISCONTINUED | OUTPATIENT
Start: 2019-11-25 | End: 2019-11-27 | Stop reason: HOSPADM

## 2019-11-25 RX ADMIN — MORPHINE SULFATE 2 MG: 4 INJECTION INTRAVENOUS at 08:49

## 2019-11-25 RX ADMIN — Medication 10 ML: at 20:50

## 2019-11-25 RX ADMIN — PIPERACILLIN AND TAZOBACTAM 3.38 G: 3; .375 INJECTION, POWDER, LYOPHILIZED, FOR SOLUTION INTRAVENOUS at 08:49

## 2019-11-25 RX ADMIN — Medication 10 ML: at 09:16

## 2019-11-25 RX ADMIN — POTASSIUM CHLORIDE 10 MEQ: 10 INJECTION, SOLUTION INTRAVENOUS at 23:29

## 2019-11-25 RX ADMIN — POTASSIUM CHLORIDE 10 MEQ: 10 INJECTION, SOLUTION INTRAVENOUS at 07:53

## 2019-11-25 RX ADMIN — PIPERACILLIN AND TAZOBACTAM 3.38 G: 3; .375 INJECTION, POWDER, LYOPHILIZED, FOR SOLUTION INTRAVENOUS at 02:36

## 2019-11-25 RX ADMIN — MORPHINE SULFATE 2 MG: 4 INJECTION INTRAVENOUS at 12:13

## 2019-11-25 RX ADMIN — PANTOPRAZOLE SODIUM 40 MG: 40 INJECTION, POWDER, FOR SOLUTION INTRAVENOUS at 20:50

## 2019-11-25 RX ADMIN — PIPERACILLIN AND TAZOBACTAM 3.38 G: 3; .375 INJECTION, POWDER, LYOPHILIZED, FOR SOLUTION INTRAVENOUS at 18:20

## 2019-11-25 RX ADMIN — PANTOPRAZOLE SODIUM 40 MG: 40 INJECTION, POWDER, FOR SOLUTION INTRAVENOUS at 08:49

## 2019-11-25 RX ADMIN — POTASSIUM CHLORIDE 10 MEQ: 10 INJECTION, SOLUTION INTRAVENOUS at 05:50

## 2019-11-26 ENCOUNTER — APPOINTMENT (OUTPATIENT)
Dept: GENERAL RADIOLOGY | Facility: HOSPITAL | Age: 74
End: 2019-11-26

## 2019-11-26 LAB
ALBUMIN SERPL-MCNC: 3.1 G/DL (ref 3.5–5.2)
ALBUMIN/GLOB SERPL: 1.1 G/DL
ALP SERPL-CCNC: 82 U/L (ref 39–117)
ALT SERPL W P-5'-P-CCNC: 9 U/L (ref 1–41)
ANION GAP SERPL CALCULATED.3IONS-SCNC: 13 MMOL/L (ref 5–15)
AST SERPL-CCNC: 14 U/L (ref 1–40)
BASOPHILS # BLD AUTO: 0.1 10*3/MM3 (ref 0–0.2)
BASOPHILS NFR BLD AUTO: 0.9 % (ref 0–1.5)
BILIRUB SERPL-MCNC: 0.5 MG/DL (ref 0.2–1.2)
BUN BLD-MCNC: 8 MG/DL (ref 8–23)
BUN/CREAT SERPL: 11 (ref 7–25)
CALCIUM SPEC-SCNC: 8 MG/DL (ref 8.6–10.5)
CHLORIDE SERPL-SCNC: 106 MMOL/L (ref 98–107)
CO2 SERPL-SCNC: 21 MMOL/L (ref 22–29)
CREAT BLD-MCNC: 0.73 MG/DL (ref 0.76–1.27)
DEPRECATED RDW RBC AUTO: 42.4 FL (ref 37–54)
EOSINOPHIL # BLD AUTO: 0.4 10*3/MM3 (ref 0–0.4)
EOSINOPHIL NFR BLD AUTO: 5.6 % (ref 0.3–6.2)
ERYTHROCYTE [DISTWIDTH] IN BLOOD BY AUTOMATED COUNT: 13.9 % (ref 12.3–15.4)
GFR SERPL CREATININE-BSD FRML MDRD: 105 ML/MIN/1.73
GLOBULIN UR ELPH-MCNC: 2.7 GM/DL
GLUCOSE BLD-MCNC: 67 MG/DL (ref 65–99)
HCT VFR BLD AUTO: 28.4 % (ref 37.5–51)
HGB BLD-MCNC: 9.7 G/DL (ref 13–17.7)
LYMPHOCYTES # BLD AUTO: 0.9 10*3/MM3 (ref 0.7–3.1)
LYMPHOCYTES NFR BLD AUTO: 12.5 % (ref 19.6–45.3)
MCH RBC QN AUTO: 29.9 PG (ref 26.6–33)
MCHC RBC AUTO-ENTMCNC: 34.3 G/DL (ref 31.5–35.7)
MCV RBC AUTO: 87.3 FL (ref 79–97)
MONOCYTES # BLD AUTO: 0.6 10*3/MM3 (ref 0.1–0.9)
MONOCYTES NFR BLD AUTO: 7.8 % (ref 5–12)
NEUTROPHILS # BLD AUTO: 5.3 10*3/MM3 (ref 1.7–7)
NEUTROPHILS NFR BLD AUTO: 73.2 % (ref 42.7–76)
NRBC BLD AUTO-RTO: 0 /100 WBC (ref 0–0.2)
PLATELET # BLD AUTO: 263 10*3/MM3 (ref 140–450)
PMV BLD AUTO: 7.7 FL (ref 6–12)
POTASSIUM BLD-SCNC: 3.7 MMOL/L (ref 3.5–5.2)
PROT SERPL-MCNC: 5.8 G/DL (ref 6–8.5)
RBC # BLD AUTO: 3.25 10*6/MM3 (ref 4.14–5.8)
SODIUM BLD-SCNC: 140 MMOL/L (ref 136–145)
WBC NRBC COR # BLD: 7.2 10*3/MM3 (ref 3.4–10.8)

## 2019-11-26 PROCEDURE — 74018 RADEX ABDOMEN 1 VIEW: CPT

## 2019-11-26 PROCEDURE — 25010000002 PIPERACILLIN SOD-TAZOBACTAM PER 1 G: Performed by: SURGERY

## 2019-11-26 PROCEDURE — 25010000003 POTASSIUM CHLORIDE 10 MEQ/100ML SOLUTION: Performed by: INTERNAL MEDICINE

## 2019-11-26 PROCEDURE — 99232 SBSQ HOSP IP/OBS MODERATE 35: CPT | Performed by: INTERNAL MEDICINE

## 2019-11-26 PROCEDURE — 63710000001 DIPHENHYDRAMINE PER 50 MG: Performed by: NURSE PRACTITIONER

## 2019-11-26 PROCEDURE — 85025 COMPLETE CBC W/AUTO DIFF WBC: CPT | Performed by: INTERNAL MEDICINE

## 2019-11-26 PROCEDURE — 80053 COMPREHEN METABOLIC PANEL: CPT | Performed by: INTERNAL MEDICINE

## 2019-11-26 RX ORDER — POTASSIUM CHLORIDE 20 MEQ/1
20 TABLET, EXTENDED RELEASE ORAL DAILY
Start: 2020-11-03

## 2019-11-26 RX ORDER — POTASSIUM CHLORIDE 750 MG/1
20 TABLET, FILM COATED, EXTENDED RELEASE ORAL 2 TIMES DAILY
Qty: 28 TABLET | Refills: 0
Start: 2019-11-26 | End: 2019-12-03

## 2019-11-26 RX ORDER — HYDROCODONE BITARTRATE AND ACETAMINOPHEN 5; 325 MG/1; MG/1
1 TABLET ORAL EVERY 6 HOURS PRN
Qty: 15 TABLET | Refills: 0 | Status: SHIPPED | OUTPATIENT
Start: 2019-11-26

## 2019-11-26 RX ORDER — AMOXICILLIN AND CLAVULANATE POTASSIUM 875; 125 MG/1; MG/1
1 TABLET, FILM COATED ORAL 2 TIMES DAILY
Qty: 14 TABLET | Refills: 0
Start: 2019-11-26

## 2019-11-26 RX ORDER — GABAPENTIN 300 MG/1
300 CAPSULE ORAL 3 TIMES DAILY
Qty: 30 CAPSULE | Refills: 0 | Status: SHIPPED | OUTPATIENT
Start: 2019-11-26

## 2019-11-26 RX ADMIN — PIPERACILLIN AND TAZOBACTAM 3.38 G: 3; .375 INJECTION, POWDER, LYOPHILIZED, FOR SOLUTION INTRAVENOUS at 05:38

## 2019-11-26 RX ADMIN — SODIUM CHLORIDE 100 ML/HR: 900 INJECTION, SOLUTION INTRAVENOUS at 12:25

## 2019-11-26 RX ADMIN — DIPHENHYDRAMINE HCL 25 MG: 25 TABLET ORAL at 21:54

## 2019-11-26 RX ADMIN — HYDROCODONE BITARTRATE AND ACETAMINOPHEN 1 TABLET: 7.5; 325 TABLET ORAL at 21:54

## 2019-11-26 RX ADMIN — PANTOPRAZOLE SODIUM 40 MG: 40 INJECTION, POWDER, FOR SOLUTION INTRAVENOUS at 12:21

## 2019-11-26 RX ADMIN — PIPERACILLIN AND TAZOBACTAM 3.38 G: 3; .375 INJECTION, POWDER, LYOPHILIZED, FOR SOLUTION INTRAVENOUS at 12:21

## 2019-11-26 RX ADMIN — POTASSIUM CHLORIDE 10 MEQ: 10 INJECTION, SOLUTION INTRAVENOUS at 04:08

## 2019-11-26 RX ADMIN — POTASSIUM CHLORIDE 10 MEQ: 10 INJECTION, SOLUTION INTRAVENOUS at 00:56

## 2019-11-26 RX ADMIN — POTASSIUM CHLORIDE 10 MEQ: 10 INJECTION, SOLUTION INTRAVENOUS at 02:23

## 2019-11-27 VITALS
RESPIRATION RATE: 20 BRPM | OXYGEN SATURATION: 96 % | HEART RATE: 78 BPM | SYSTOLIC BLOOD PRESSURE: 121 MMHG | TEMPERATURE: 98.7 F | BODY MASS INDEX: 23.21 KG/M2 | HEIGHT: 66 IN | WEIGHT: 144.4 LBS | DIASTOLIC BLOOD PRESSURE: 72 MMHG

## 2019-11-27 LAB
ANION GAP SERPL CALCULATED.3IONS-SCNC: 16 MMOL/L (ref 5–15)
BASOPHILS # BLD AUTO: 0.1 10*3/MM3 (ref 0–0.2)
BASOPHILS NFR BLD AUTO: 1 % (ref 0–1.5)
BUN BLD-MCNC: 6 MG/DL (ref 8–23)
BUN/CREAT SERPL: 8.5 (ref 7–25)
CALCIUM SPEC-SCNC: 8.7 MG/DL (ref 8.6–10.5)
CHLORIDE SERPL-SCNC: 102 MMOL/L (ref 98–107)
CO2 SERPL-SCNC: 19 MMOL/L (ref 22–29)
CREAT BLD-MCNC: 0.71 MG/DL (ref 0.76–1.27)
DEPRECATED RDW RBC AUTO: 42.9 FL (ref 37–54)
EOSINOPHIL # BLD AUTO: 0.4 10*3/MM3 (ref 0–0.4)
EOSINOPHIL NFR BLD AUTO: 5.8 % (ref 0.3–6.2)
ERYTHROCYTE [DISTWIDTH] IN BLOOD BY AUTOMATED COUNT: 13.8 % (ref 12.3–15.4)
GFR SERPL CREATININE-BSD FRML MDRD: 108 ML/MIN/1.73
GLUCOSE BLD-MCNC: 76 MG/DL (ref 65–99)
HCT VFR BLD AUTO: 31.5 % (ref 37.5–51)
HGB BLD-MCNC: 11.1 G/DL (ref 13–17.7)
LYMPHOCYTES # BLD AUTO: 1.3 10*3/MM3 (ref 0.7–3.1)
LYMPHOCYTES NFR BLD AUTO: 19.7 % (ref 19.6–45.3)
MAGNESIUM SERPL-MCNC: 1.7 MG/DL (ref 1.6–2.4)
MCH RBC QN AUTO: 30.7 PG (ref 26.6–33)
MCHC RBC AUTO-ENTMCNC: 35.1 G/DL (ref 31.5–35.7)
MCV RBC AUTO: 87.4 FL (ref 79–97)
MONOCYTES # BLD AUTO: 0.7 10*3/MM3 (ref 0.1–0.9)
MONOCYTES NFR BLD AUTO: 9.7 % (ref 5–12)
NEUTROPHILS # BLD AUTO: 4.4 10*3/MM3 (ref 1.7–7)
NEUTROPHILS NFR BLD AUTO: 63.8 % (ref 42.7–76)
NRBC BLD AUTO-RTO: 0.1 /100 WBC (ref 0–0.2)
PLATELET # BLD AUTO: 353 10*3/MM3 (ref 140–450)
PMV BLD AUTO: 7.9 FL (ref 6–12)
POTASSIUM BLD-SCNC: 3.2 MMOL/L (ref 3.5–5.2)
RBC # BLD AUTO: 3.61 10*6/MM3 (ref 4.14–5.8)
SODIUM BLD-SCNC: 137 MMOL/L (ref 136–145)
WBC NRBC COR # BLD: 6.9 10*3/MM3 (ref 3.4–10.8)

## 2019-11-27 PROCEDURE — 85025 COMPLETE CBC W/AUTO DIFF WBC: CPT | Performed by: INTERNAL MEDICINE

## 2019-11-27 PROCEDURE — 80048 BASIC METABOLIC PNL TOTAL CA: CPT | Performed by: INTERNAL MEDICINE

## 2019-11-27 PROCEDURE — 83735 ASSAY OF MAGNESIUM: CPT | Performed by: INTERNAL MEDICINE

## 2019-11-27 PROCEDURE — 99239 HOSP IP/OBS DSCHRG MGMT >30: CPT | Performed by: INTERNAL MEDICINE

## 2019-11-27 RX ORDER — MAGNESIUM OXIDE 400 MG/1
400 TABLET ORAL DAILY
Start: 2019-11-27

## 2019-11-27 RX ORDER — SPIRONOLACTONE 25 MG/1
25 TABLET ORAL DAILY
Status: DISCONTINUED | OUTPATIENT
Start: 2019-11-27 | End: 2019-11-27 | Stop reason: HOSPADM

## 2019-11-27 RX ORDER — SPIRONOLACTONE 25 MG/1
25 TABLET ORAL DAILY
Start: 2019-11-27

## 2019-11-27 RX ORDER — PANTOPRAZOLE SODIUM 40 MG/1
40 TABLET, DELAYED RELEASE ORAL
Status: DISCONTINUED | OUTPATIENT
Start: 2019-11-27 | End: 2019-11-27 | Stop reason: HOSPADM

## 2019-11-27 RX ADMIN — SPIRONOLACTONE 25 MG: 25 TABLET, FILM COATED ORAL at 11:46

## 2019-11-27 RX ADMIN — POTASSIUM CHLORIDE 40 MEQ: 1500 TABLET, EXTENDED RELEASE ORAL at 07:08

## 2019-11-27 RX ADMIN — POTASSIUM CHLORIDE 40 MEQ: 1500 TABLET, EXTENDED RELEASE ORAL at 11:45

## 2019-11-27 RX ADMIN — PANTOPRAZOLE SODIUM 40 MG: 40 TABLET, DELAYED RELEASE ORAL at 11:45

## 2019-11-28 LAB
BACTERIA SPEC AEROBE CULT: NORMAL
BACTERIA SPEC AEROBE CULT: NORMAL

## 2019-12-01 PROCEDURE — 93010 ELECTROCARDIOGRAM REPORT: CPT | Performed by: INTERNAL MEDICINE

## 2019-12-05 ENCOUNTER — HOSPITAL ENCOUNTER (OUTPATIENT)
Dept: OTHER | Facility: HOSPITAL | Age: 74
Discharge: HOME | End: 2019-12-05

## 2019-12-05 DIAGNOSIS — Z00.6 EXAMINATION FOR NORMAL COMPARISON OR CONTROL IN CLINICAL RESEARCH: ICD-10-CM

## 2019-12-11 ENCOUNTER — OFFICE VISIT (OUTPATIENT)
Dept: SURGERY | Facility: CLINIC | Age: 74
End: 2019-12-11

## 2019-12-11 ENCOUNTER — TELEPHONE (OUTPATIENT)
Dept: ORTHOPEDIC SURGERY | Facility: CLINIC | Age: 74
End: 2019-12-11

## 2019-12-11 VITALS — HEIGHT: 66 IN | BODY MASS INDEX: 22.18 KG/M2 | WEIGHT: 138 LBS

## 2019-12-11 DIAGNOSIS — Z98.890 STATUS POST COLOSTOMY TAKEDOWN: Primary | ICD-10-CM

## 2019-12-11 PROCEDURE — 99024 POSTOP FOLLOW-UP VISIT: CPT | Performed by: SURGERY

## 2019-12-11 NOTE — PROGRESS NOTES
"Subjective   Mamadou Romeo is a 74 y.o. male.   Was hospitalized for bowel obstruction but is now feeling well  Denies abdominal pain  Only complaint is \"muddy\" bowel movements  Is on MiraLAX    Objective   Ht 167.6 cm (66\")   Wt 62.6 kg (138 lb)   BMI 22.27 kg/m²     Physical Exam   Abdominal:   Well-healed incisions.   Musculoskeletal:   Seated in wheelchair   Neurological: He is alert. No cranial nerve deficit.       Assessment/Plan   Mamadou was seen today for post-op.    Diagnoses and all orders for this visit:    Status post colostomy takedown    Continue Colace  Discontinue MiraLAX  Add fiber supplement  Okay for regular diet  RTC PRN    Riya Engle PA-C, 12/11/2019 3:00 PM, acting as scribe for Dr. Collins.      I, Carole Collins MD, personally performed the services described in this documentation as scribed by the above named individual in my presence, and it is both accurate and complete.  12/11/2019  3:07 PM  "

## 2024-03-19 ENCOUNTER — APPOINTMENT (RX ONLY)
Dept: URBAN - METROPOLITAN AREA CLINIC 23 | Facility: CLINIC | Age: 79
Setting detail: DERMATOLOGY
End: 2024-03-19

## 2024-03-19 DIAGNOSIS — L21.8 OTHER SEBORRHEIC DERMATITIS: ICD-10-CM

## 2024-03-19 DIAGNOSIS — L40.0 PSORIASIS VULGARIS: ICD-10-CM

## 2024-03-19 PROBLEM — L30.9 DERMATITIS, UNSPECIFIED: Status: ACTIVE | Noted: 2024-03-19

## 2024-03-19 PROCEDURE — 11102 TANGNTL BX SKIN SINGLE LES: CPT

## 2024-03-19 PROCEDURE — 99204 OFFICE O/P NEW MOD 45 MIN: CPT | Mod: 25

## 2024-03-19 PROCEDURE — ? OTHER

## 2024-03-19 PROCEDURE — ? BIOPSY BY SHAVE METHOD

## 2024-03-19 PROCEDURE — ? PRESCRIPTION MEDICATION MANAGEMENT

## 2024-03-19 ASSESSMENT — LOCATION DETAILED DESCRIPTION DERM: LOCATION DETAILED: RIGHT INFERIOR PARIETAL SCALP

## 2024-03-19 ASSESSMENT — LOCATION ZONE DERM: LOCATION ZONE: SCALP

## 2024-03-19 ASSESSMENT — LOCATION SIMPLE DESCRIPTION DERM: LOCATION SIMPLE: SCALP

## 2024-03-19 NOTE — PROCEDURE: OTHER
Detail Level: Zone
Note Text (......Xxx Chief Complaint.): This diagnosis correlates with the
Other (Free Text): Patient is very frustrated that his scalp is still itchy/flaking even though he has tried several topical medications over the past 20 years. Discussed at length with patient that pending biopsy result will determine if PsO biologic is next best step in treatment. Per patient he has tried and failed numerous topical OTC and Rx shampoos and topical steroids. He states that he wants a “cure” for his rash, we discussed at length that whether he has seborrheic dermatitis or PsO there is no cure for either condition.
Render Risk Assessment In Note?: no

## 2024-04-02 ENCOUNTER — APPOINTMENT (RX ONLY)
Dept: URBAN - METROPOLITAN AREA CLINIC 23 | Facility: CLINIC | Age: 79
Setting detail: DERMATOLOGY
End: 2024-04-02

## 2024-04-02 DIAGNOSIS — L40.0 PSORIASIS VULGARIS: ICD-10-CM

## 2024-04-02 PROCEDURE — 99214 OFFICE O/P EST MOD 30 MIN: CPT

## 2024-04-02 PROCEDURE — ? COUNSELING

## 2024-04-02 PROCEDURE — ? PRESCRIPTION MEDICATION MANAGEMENT

## 2024-04-02 PROCEDURE — ? COUNSELING: OTEZLA

## 2024-04-02 ASSESSMENT — LOCATION DETAILED DESCRIPTION DERM
LOCATION DETAILED: MID-OCCIPITAL SCALP
LOCATION DETAILED: MEDIAL FRONTAL SCALP
LOCATION DETAILED: LEFT SUPERIOR PARIETAL SCALP

## 2024-04-02 ASSESSMENT — LOCATION SIMPLE DESCRIPTION DERM
LOCATION SIMPLE: SCALP
LOCATION SIMPLE: POSTERIOR SCALP
LOCATION SIMPLE: FRONTAL SCALP

## 2024-04-02 ASSESSMENT — LOCATION ZONE DERM: LOCATION ZONE: SCALP

## 2024-04-02 NOTE — PROCEDURE: PRESCRIPTION MEDICATION MANAGEMENT
Group Therapy Note    Date: 7/23/2020    Group Start Time: 7792  Group End Time: 1600    Number of Participants: 4/5    Type: Exercise/Recreation Group    Group Topic/Objective: Chair Exercises     Notes:  Pt completed majority of exercises. At the beginning, pt noted to struggle to follow 1-step directions. After ~10 minutes, pt began to follow directions and completed remaining exercises. Status After Intervention:  Improved    Participation Level:  Active Listener and Interactive    Participation Quality: Appropriate and Attentive    Speech:  normal    Thought Process/Content: Confused    Affective Functioning: Congruent    Mood: Bright    Level of consciousness:  Attentive    Response to Learning: Able to verbalize current knowledge/experience and Able to verbalize/acknowledge new learning    Endings: None Reported    Modes of Intervention: Activity and Movement    Discipline Responsible: Certified Therapeutic Recreation Specialist     Electronically signed by Reji Boone MA on 7/23/2020 at 4:06 PM
Samples Given: Otezla - explained to patient how to titrate to 30mg BID dose and to continue on this dose using starter packs x 1 month
Plan: Discussed with patient it is difficult to discern from seborrheic dermatitis and PsO on the scalp. He is extremely frustrated with treatments used in the past to treat seborrheic dermatitis. He prefers to start Otezla at this time. If working well, plan to send Rx in 1 month.
Initiate Treatment: Otezla starter pack
Detail Level: Zone
Render In Strict Bullet Format?: No
Continue Regimen: VTAMA QHS to elbow - patient reports improvement since using samples since last visit

## 2024-05-01 ENCOUNTER — RX ONLY (OUTPATIENT)
Age: 79
Setting detail: RX ONLY
End: 2024-05-01

## 2024-05-01 RX ORDER — CLOBETASOL PROPIONATE 0.5 MG/ML
SOLUTION TOPICAL
Qty: 50 | Refills: 5 | Status: ERX | COMMUNITY
Start: 2024-05-01

## (undated) DEVICE — SUT VIC 0 CT1 CR8 18IN J840D

## (undated) DEVICE — GLV SURG BIOGEL SENSR LTX PF SZ7.5

## (undated) DEVICE — ELECTRD BLD EZ CLN MOD 6.5IN

## (undated) DEVICE — ADHS LIQ MASTISOL 2/3ML

## (undated) DEVICE — GLV SURG TRIUMPH GREEN W/ALOE PF LTX 7 STRL

## (undated) DEVICE — COVER,MAYO STAND,STERILE: Brand: MEDLINE

## (undated) DEVICE — CUFF SCD HEMOFORCE SEQ CALF STD MD

## (undated) DEVICE — TP SXN YANKR BULB STRL

## (undated) DEVICE — GLV SURG BIOGEL SENSR LTX PF SZ6.5

## (undated) DEVICE — PROXIMATE RH ROTATING HEAD SKIN STAPLERS (35 WIDE) CONTAINS 35 STAINLESS STEEL STAPLES: Brand: PROXIMATE

## (undated) DEVICE — SUT SILK 2/0 FS BLK 18IN 685G

## (undated) DEVICE — SYR LUERLOK 30CC

## (undated) DEVICE — DRSNG WND GZ PAD BORDERED 4X8IN STRL

## (undated) DEVICE — CVR HNDL LT CAM LB54

## (undated) DEVICE — SUT VIC 3/0 SH CR8 18IN J864D

## (undated) DEVICE — SUT VIC 1 CTX 36IN J977H

## (undated) DEVICE — ABDOMINAL BINDER: Brand: DEROYAL

## (undated) DEVICE — GOWN,REINFORCE,POLY,SIRUS,BREATH SLV,XLG: Brand: MEDLINE

## (undated) DEVICE — PK PROC TURNOVER

## (undated) DEVICE — SUT SILK 3/0 SH CR8 30IN C017D

## (undated) DEVICE — CVR HNDL LT SURG ACCSSRY BLU STRL

## (undated) DEVICE — PENCL HND ROCKRSWTCH HOLSTR EZ CLEAN TP CRD 10FT

## (undated) DEVICE — DRAPE SHEET ULTRAGARD: Brand: MEDLINE

## (undated) DEVICE — DRN WND EVAC BULB 100CC

## (undated) DEVICE — SUT PDS 3/0 SH 27IN DYED Z316H

## (undated) DEVICE — Device

## (undated) DEVICE — DRSNG WND BORDR/ADHS NONADHR/GZ LF 4X4IN STRL

## (undated) DEVICE — SOL IRRIG H2O 1000ML STRL

## (undated) DEVICE — TOWEL,OR,DSP,ST,WHITE,DLX,4/PK,20PK/CS: Brand: MEDLINE

## (undated) DEVICE — ENSEAL 20 CM SHAFT, LARGE JAW: Brand: ENSEAL X1

## (undated) DEVICE — SUT VIC 3/0 CTI 36IN J944H

## (undated) DEVICE — BLAKE SILICONE DRAIN, 19 FR ROUND, HUBLESS WITH 1/4" BENDABLE TROCAR: Brand: BLAKE

## (undated) DEVICE — DRAPE, LAVH, STERILE: Brand: MEDLINE

## (undated) DEVICE — TUBING, SUCTION, 1/4" X 12', STRAIGHT: Brand: MEDLINE

## (undated) DEVICE — PK MAJ LAPAROTOMY 50